# Patient Record
Sex: FEMALE | Race: WHITE | NOT HISPANIC OR LATINO
[De-identification: names, ages, dates, MRNs, and addresses within clinical notes are randomized per-mention and may not be internally consistent; named-entity substitution may affect disease eponyms.]

---

## 2017-04-09 ENCOUNTER — TRANSCRIPTION ENCOUNTER (OUTPATIENT)
Age: 27
End: 2017-04-09

## 2017-04-13 ENCOUNTER — TRANSCRIPTION ENCOUNTER (OUTPATIENT)
Age: 27
End: 2017-04-13

## 2018-09-25 ENCOUNTER — APPOINTMENT (OUTPATIENT)
Dept: INFECTIOUS DISEASE | Facility: CLINIC | Age: 28
End: 2018-09-25
Payer: SELF-PAY

## 2018-09-25 DIAGNOSIS — Z71.89 OTHER SPECIFIED COUNSELING: ICD-10-CM

## 2018-09-25 PROBLEM — Z00.00 ENCOUNTER FOR PREVENTIVE HEALTH EXAMINATION: Status: ACTIVE | Noted: 2018-09-25

## 2018-09-25 PROCEDURE — 90632 HEPA VACCINE ADULT IM: CPT

## 2018-09-25 PROCEDURE — 99401 PREV MED CNSL INDIV APPRX 15: CPT | Mod: 25

## 2018-09-25 PROCEDURE — 90691 TYPHOID VACCINE IM: CPT

## 2018-09-25 PROCEDURE — 90471 IMMUNIZATION ADMIN: CPT | Mod: NC

## 2018-09-25 PROCEDURE — 90472 IMMUNIZATION ADMIN EACH ADD: CPT | Mod: NC

## 2018-09-25 RX ORDER — ATOVAQUONE AND PROGUANIL HYDROCHLORIDE 250; 100 MG/1; MG/1
250-100 TABLET, FILM COATED ORAL DAILY
Qty: 28 | Refills: 0 | Status: ACTIVE | COMMUNITY
Start: 2018-09-25 | End: 1900-01-01

## 2018-09-26 ENCOUNTER — RESULT REVIEW (OUTPATIENT)
Age: 28
End: 2018-09-26

## 2018-10-13 ENCOUNTER — TRANSCRIPTION ENCOUNTER (OUTPATIENT)
Age: 28
End: 2018-10-13

## 2019-02-16 ENCOUNTER — TRANSCRIPTION ENCOUNTER (OUTPATIENT)
Age: 29
End: 2019-02-16

## 2019-04-01 ENCOUNTER — TRANSCRIPTION ENCOUNTER (OUTPATIENT)
Age: 29
End: 2019-04-01

## 2019-04-01 ENCOUNTER — INPATIENT (INPATIENT)
Facility: HOSPITAL | Age: 29
LOS: 0 days | Discharge: HOME | DRG: 833 | End: 2019-04-01
Attending: OBSTETRICS & GYNECOLOGY | Admitting: OBSTETRICS & GYNECOLOGY
Payer: COMMERCIAL

## 2019-04-01 VITALS
WEIGHT: 130.07 LBS | SYSTOLIC BLOOD PRESSURE: 131 MMHG | HEART RATE: 82 BPM | HEIGHT: 62 IN | OXYGEN SATURATION: 100 % | TEMPERATURE: 98 F | DIASTOLIC BLOOD PRESSURE: 85 MMHG | RESPIRATION RATE: 18 BRPM

## 2019-04-01 VITALS
SYSTOLIC BLOOD PRESSURE: 117 MMHG | HEART RATE: 88 BPM | OXYGEN SATURATION: 100 % | RESPIRATION RATE: 18 BRPM | TEMPERATURE: 98 F | DIASTOLIC BLOOD PRESSURE: 82 MMHG

## 2019-04-01 DIAGNOSIS — O00.101 RIGHT TUBAL PREGNANCY WITHOUT INTRAUTERINE PREGNANCY: ICD-10-CM

## 2019-04-01 LAB
ALBUMIN SERPL ELPH-MCNC: 4.7 G/DL — SIGNIFICANT CHANGE UP (ref 3.3–5)
ALP SERPL-CCNC: 55 U/L — SIGNIFICANT CHANGE UP (ref 40–120)
ALT FLD-CCNC: 15 U/L — SIGNIFICANT CHANGE UP (ref 10–45)
ANION GAP SERPL CALC-SCNC: 12 MMOL/L — SIGNIFICANT CHANGE UP (ref 5–17)
ANTIBODY ID 1_1: SIGNIFICANT CHANGE UP
APPEARANCE UR: CLEAR — SIGNIFICANT CHANGE UP
APTT BLD: 28.2 SEC — SIGNIFICANT CHANGE UP (ref 27.5–36.3)
AST SERPL-CCNC: 17 U/L — SIGNIFICANT CHANGE UP (ref 10–40)
BACTERIA # UR AUTO: NEGATIVE — SIGNIFICANT CHANGE UP
BASOPHILS # BLD AUTO: 0 K/UL — SIGNIFICANT CHANGE UP (ref 0–0.2)
BASOPHILS NFR BLD AUTO: 0.4 % — SIGNIFICANT CHANGE UP (ref 0–2)
BILIRUB SERPL-MCNC: 0.3 MG/DL — SIGNIFICANT CHANGE UP (ref 0.2–1.2)
BILIRUB UR-MCNC: NEGATIVE — SIGNIFICANT CHANGE UP
BLD GP AB SCN SERPL QL: POSITIVE — SIGNIFICANT CHANGE UP
BUN SERPL-MCNC: 9 MG/DL — SIGNIFICANT CHANGE UP (ref 7–23)
CALCIUM SERPL-MCNC: 9.2 MG/DL — SIGNIFICANT CHANGE UP (ref 8.4–10.5)
CHLORIDE SERPL-SCNC: 109 MMOL/L — HIGH (ref 96–108)
CO2 SERPL-SCNC: 21 MMOL/L — LOW (ref 22–31)
COLOR SPEC: COLORLESS — SIGNIFICANT CHANGE UP
CREAT SERPL-MCNC: 0.58 MG/DL — SIGNIFICANT CHANGE UP (ref 0.5–1.3)
DIFF PNL FLD: NEGATIVE — SIGNIFICANT CHANGE UP
EOSINOPHIL # BLD AUTO: 0.2 K/UL — SIGNIFICANT CHANGE UP (ref 0–0.5)
EOSINOPHIL NFR BLD AUTO: 3.2 % — SIGNIFICANT CHANGE UP (ref 0–6)
EPI CELLS # UR: 6 — HIGH
GLUCOSE SERPL-MCNC: 103 MG/DL — HIGH (ref 70–99)
GLUCOSE UR QL: NEGATIVE — SIGNIFICANT CHANGE UP
HCG SERPL-ACNC: 92.6 MIU/ML — HIGH
HCT VFR BLD CALC: 38 % — SIGNIFICANT CHANGE UP (ref 34.5–45)
HGB BLD-MCNC: 13.3 G/DL — SIGNIFICANT CHANGE UP (ref 11.5–15.5)
HYALINE CASTS # UR AUTO: 2 /LPF — SIGNIFICANT CHANGE UP (ref 0–7)
INR BLD: 0.94 RATIO — SIGNIFICANT CHANGE UP (ref 0.88–1.16)
KETONES UR-MCNC: NEGATIVE — SIGNIFICANT CHANGE UP
LEUKOCYTE ESTERASE UR-ACNC: NEGATIVE — SIGNIFICANT CHANGE UP
LYMPHOCYTES # BLD AUTO: 2.3 K/UL — SIGNIFICANT CHANGE UP (ref 1–3.3)
LYMPHOCYTES # BLD AUTO: 40.9 % — SIGNIFICANT CHANGE UP (ref 13–44)
MCHC RBC-ENTMCNC: 34.1 PG — HIGH (ref 27–34)
MCHC RBC-ENTMCNC: 34.9 GM/DL — SIGNIFICANT CHANGE UP (ref 32–36)
MCV RBC AUTO: 97.8 FL — SIGNIFICANT CHANGE UP (ref 80–100)
MONOCYTES # BLD AUTO: 0.5 K/UL — SIGNIFICANT CHANGE UP (ref 0–0.9)
MONOCYTES NFR BLD AUTO: 8.9 % — SIGNIFICANT CHANGE UP (ref 2–14)
NEUTROPHILS # BLD AUTO: 2.6 K/UL — SIGNIFICANT CHANGE UP (ref 1.8–7.4)
NEUTROPHILS NFR BLD AUTO: 46.5 % — SIGNIFICANT CHANGE UP (ref 43–77)
NITRITE UR-MCNC: NEGATIVE — SIGNIFICANT CHANGE UP
PH UR: 6.5 — SIGNIFICANT CHANGE UP (ref 5–8)
PLATELET # BLD AUTO: 248 K/UL — SIGNIFICANT CHANGE UP (ref 150–400)
POTASSIUM SERPL-MCNC: 4.1 MMOL/L — SIGNIFICANT CHANGE UP (ref 3.5–5.3)
POTASSIUM SERPL-SCNC: 4.1 MMOL/L — SIGNIFICANT CHANGE UP (ref 3.5–5.3)
PROT SERPL-MCNC: 7 G/DL — SIGNIFICANT CHANGE UP (ref 6–8.3)
PROT UR-MCNC: NEGATIVE — SIGNIFICANT CHANGE UP
PROTHROM AB SERPL-ACNC: 10.8 SEC — SIGNIFICANT CHANGE UP (ref 10–12.9)
RBC # BLD: 3.89 M/UL — SIGNIFICANT CHANGE UP (ref 3.8–5.2)
RBC # FLD: 11.5 % — SIGNIFICANT CHANGE UP (ref 10.3–14.5)
RBC CASTS # UR COMP ASSIST: 2 /HPF — SIGNIFICANT CHANGE UP (ref 0–4)
RH IG SCN BLD-IMP: NEGATIVE — SIGNIFICANT CHANGE UP
SODIUM SERPL-SCNC: 142 MMOL/L — SIGNIFICANT CHANGE UP (ref 135–145)
SP GR SPEC: 1.01 — LOW (ref 1.01–1.02)
UROBILINOGEN FLD QL: NEGATIVE — SIGNIFICANT CHANGE UP
WBC # BLD: 5.6 K/UL — SIGNIFICANT CHANGE UP (ref 3.8–10.5)
WBC # FLD AUTO: 5.6 K/UL — SIGNIFICANT CHANGE UP (ref 3.8–10.5)
WBC UR QL: 7 /HPF — HIGH (ref 0–5)

## 2019-04-01 PROCEDURE — 99285 EMERGENCY DEPT VISIT HI MDM: CPT

## 2019-04-01 PROCEDURE — 84702 CHORIONIC GONADOTROPIN TEST: CPT

## 2019-04-01 PROCEDURE — 86901 BLOOD TYPING SEROLOGIC RH(D): CPT

## 2019-04-01 PROCEDURE — 93975 VASCULAR STUDY: CPT | Mod: 26

## 2019-04-01 PROCEDURE — 86077 PHYS BLOOD BANK SERV XMATCH: CPT

## 2019-04-01 PROCEDURE — 76817 TRANSVAGINAL US OBSTETRIC: CPT | Mod: 26

## 2019-04-01 PROCEDURE — 86870 RBC ANTIBODY IDENTIFICATION: CPT

## 2019-04-01 PROCEDURE — 81001 URINALYSIS AUTO W/SCOPE: CPT

## 2019-04-01 PROCEDURE — 76817 TRANSVAGINAL US OBSTETRIC: CPT

## 2019-04-01 PROCEDURE — 93975 VASCULAR STUDY: CPT

## 2019-04-01 PROCEDURE — 85027 COMPLETE CBC AUTOMATED: CPT

## 2019-04-01 PROCEDURE — 86850 RBC ANTIBODY SCREEN: CPT

## 2019-04-01 PROCEDURE — 86900 BLOOD TYPING SEROLOGIC ABO: CPT

## 2019-04-01 PROCEDURE — 85610 PROTHROMBIN TIME: CPT

## 2019-04-01 PROCEDURE — 85730 THROMBOPLASTIN TIME PARTIAL: CPT

## 2019-04-01 PROCEDURE — 80053 COMPREHEN METABOLIC PANEL: CPT

## 2019-04-01 NOTE — DISCHARGE NOTE PROVIDER - HOSPITAL COURSE
27yo  LMP: sent in by Dr. Head for ectopic pregnancy s/p MTX x2. Initial bHcg>5000, downtrended to 103 s/p 2 doses MTX. hBCG today 92.6 with    TVUS showing Right-sided adnexal mass consistent with known right-sided     pregnancy measures 1.8 cm x 1.4 cm x 2.1 cm. Within the mass an oval     cystic component likely representing the gestational sac is appreciated     measuring 1.4 cm x 1.2 cm x 9 mm. There is no yolk sac or fetal pole is     identified. 4.3 cm x 3.6 cm x 3.8 cm simple right ovarian cyst. Normal blood flow.    3.4 cm x 3.5 cm x 2.6 cm hemorrhagic left ovarian cyst. Normal blood flow.        Patient has been HD stable upon presentation with no symptoms and has continued to be stable in holding. bHCG has downtrended from previous value and mass has decreased in size. Patient would still like to have surgery but will have it scheduled for later this week as her is not negative and her ectopic is still present on US. 29yo  LMP: sent in by Dr. Head for ectopic pregnancy s/p MTX x2. Initial bHcg>5000, downtrended to 103 s/p 2 doses MTX.         T(C): 36.8 (19 @ 15:41), Max: 36.9 (19 @ 13:49)    HR: 88 (19 @ 15:41) (81 - 98)    BP: 117/82 (19 @ 15:41) (116/76 - 146/88)    RR: 18 (19 @ 15:41) (16 - 20)    SpO2: 100% (19 @ 15:41) (98% - 100%)        bHCG today 92.6     TVUS showing Right-sided adnexal mass consistent with known right-sided     pregnancy measures 1.8 cm x 1.4 cm x 2.1 cm. Within the mass an oval     cystic component likely representing the gestational sac is appreciated     measuring 1.4 cm x 1.2 cm x 9 mm. There is no yolk sac or fetal pole is     identified. 4.3 cm x 3.6 cm x 3.8 cm simple right ovarian cyst. Normal blood flow.    3.4 cm x 3.5 cm x 2.6 cm hemorrhagic left ovarian cyst. Normal blood flow.        Patient has been HD stable upon presentation with no symptoms and has continued to be stable in holding. bHCG has downtrended from previous value and mass has decreased in size. Patient would still like to have surgery but will have it scheduled for later this week as her is not negative and her ectopic is still present on US. Due to her condition, surgery would be an elective procedure. The options were discussed with the patient as well as risks in benefits in regards to waiting for surgery while here, possibly overnight, or to return for a scheduled cases late this week. Patient expressed understanding and will f/u with her OB this week and plan for a scheduled salpingectomy. 29yo  LMP: sent in by Dr. Head for ectopic pregnancy s/p MTX x2. Initial bHcg>5000, downtrended to 103 s/p 2 doses MTX.         T(C): 36.8 (19 @ 15:41), Max: 36.9 (19 @ 13:49)    HR: 88 (19 @ 15:41) (81 - 98)    BP: 117/82 (19 @ 15:41) (116/76 - 146/88)    RR: 18 (19 @ 15:41) (16 - 20)    SpO2: 100% (19 @ 15:41) (98% - 100%)        bHCG today 92.6     TVUS showing Right-sided adnexal mass consistent with known right-sided     pregnancy measures 1.8 cm x 1.4 cm x 2.1 cm. Within the mass an oval     cystic component likely representing the gestational sac is appreciated     measuring 1.4 cm x 1.2 cm x 9 mm. There is no yolk sac or fetal pole is     identified. 4.3 cm x 3.6 cm x 3.8 cm simple right ovarian cyst. Normal blood flow.    3.4 cm x 3.5 cm x 2.6 cm hemorrhagic left ovarian cyst. Normal blood flow.        Patient has been HD stable upon presentation with mild abdominal pain and has continued to be stable in holding. bHCG has downtrended from previous value and mass has decreased in size. Patient would still like to have surgery but will have it scheduled for later this week as her is not negative and her ectopic is still present on US. Due to her condition, surgery would be an elective procedure. The options were discussed with the patient as well as risks in benefits in regards to waiting for surgery while here, possibly overnight, or to return for a scheduled case late this week. Patient expressed understanding and will f/u with her OB this week and plan for a scheduled salpingectomy. 27yo  LMP: sent in by Dr. Head for ectopic pregnancy s/p MTX x2. Initial bHcg>5000, downtrended to 103 s/p 2 doses MTX.         T(C): 36.8 (19 @ 15:41), Max: 36.9 (19 @ 13:49)    HR: 88 (19 @ 15:41) (81 - 98)    BP: 117/82 (19 @ 15:41) (116/76 - 146/88)    RR: 18 (19 @ 15:41) (16 - 20)    SpO2: 100% (19 @ 15:41) (98% - 100%)        bHCG today 92.6     TVUS showing Right-sided adnexal mass consistent with known right-sided     pregnancy measures 1.8 cm x 1.4 cm x 2.1 cm. Within the mass an oval     cystic component likely representing the gestational sac is appreciated     measuring 1.4 cm x 1.2 cm x 9 mm. There is no yolk sac or fetal pole is     identified. 4.3 cm x 3.6 cm x 3.8 cm simple right ovarian cyst. Normal blood flow.    3.4 cm x 3.5 cm x 2.6 cm hemorrhagic left ovarian cyst. Normal blood flow.        Patient has been HD stable upon presentation with mild abdominal pain and has continued to be stable in holding. bHCG has downtrended from previous value and mass has decreased in size. Patient would still like to have surgery but will have it scheduled for later this week as her is not negative and her ectopic is still present on US. Due to her condition, surgery would be an elective procedure. The options were discussed with the patient as well as risks in benefits in regards to waiting for surgery while here, possibly overnight, or to return for a scheduled case late this week. Patient expressed understanding and will f/u with her OB this week and plan for a scheduled salpingectomy.        Back note:Pt seen and examined by me today.  P0 with known right sided ectopic pregnancy, s/p MTX x2 with close follow up in office with downtrending quant.  Pt presents to ER I nhopes of having surgery done, however, patient denies any pain,exam is unremarkable and there are no signs of ruptured ectopic.  Options reviewed with patient and se opted for discharge, follow up in office this week and schedule surgery as outpatient.  Strict instructions given as to when to call back/come to ER sooner.     Kaylee

## 2019-04-01 NOTE — ED ADULT NURSE NOTE - OBJECTIVE STATEMENT
27 y/o F, reported to ED from home. A&ox3, c/o RLQ abd pain. Pt reports that she has 3/10 pain with palpation. 29 y/o F, reported to ED from home. A&ox3, c/o RLQ abd pain. Pt reports that she has 3/10 pain with palpation. Pt reports that she was diagnosed with an ectopic pregnancy in January 2019. Pt was given 2 rounds of Methotrexate one in January and one in February. Pt reports that her Beta HCG levels have remained elevated. Pt also reports that she has a growth that has been consistent in size since the two rounds of Methotrexate. Pt reports some "twinging in her abdomen" since yesterday. Pt denies any vaginal bleeding currently. Pt reports that her LMP was 12/14/19. Pt denies LOC, SOB, C/P, N/V/D, abd pain. Pt denies fever or chills. Pt denies urinary symptoms. Pt's  is at the bedside. Pt's OBGYN sent her in for surgery. Will continue to monitor pt.

## 2019-04-01 NOTE — ED PROVIDER NOTE - NS ED ROS FT
ROS:  GENERAL: No fever, no chills  EYES: no change in vision  HEENT: no trouble swallowing, no trouble speaking  CARDIAC: no chest pain  PULMONARY: no cough, no shortness of breath  GI: RLQ intermittent abdominal pain, no nausea, no vomiting, no diarrhea, no constipation  : No dysuria, no frequency, no change in appearance, or odor of urine  SKIN: no rashes  NEURO: no headache, no weakness  MSK: No joint pain  ~Madhav Meléndez D.O. -Resident

## 2019-04-01 NOTE — DISCHARGE NOTE PROVIDER - CARE PROVIDER_API CALL
Emery Page (MD)  Obstetrics  Gynecology  [address] [facility] [address] [place] [address]  Phone: [phone]  Fax: [phone]  Follow Up Time: Rob Head (MD)  Obstetrics and Gynecology  [address] [facility], [place] [address]  Phone: [phone]  Fax: [phone]  Follow Up Time:

## 2019-04-01 NOTE — DISCHARGE NOTE PROVIDER - NSDCCPCAREPLAN_GEN_ALL_CORE_FT
PRINCIPAL DISCHARGE DIAGNOSIS  Diagnosis: Right tubal pregnancy without intrauterine pregnancy  Assessment and Plan of Treatment: -plan for scheduled R salpingectomy this week  -f/u with her OB office tomorrow/Wednesday PRINCIPAL DISCHARGE DIAGNOSIS  Diagnosis: Right tubal pregnancy without intrauterine pregnancy  Assessment and Plan of Treatment: HD stable with ectopic reducing in size and bHCG downtrending   -plan for scheduled R salpingectomy  -f/u with her OB office tomorrow/Wednesday

## 2019-04-01 NOTE — ED PROVIDER NOTE - ATTENDING CONTRIBUTION TO CARE
MD Chatman:  patient seen and evaluated with the resident.  I was present for key portions of the History & Physical, and I agree with the Impression & Plan.  MD Chatman:  28F,  c/o R ectopic since January.  Context:  has received MTX x 2, but has persistent ectopic on US.  Associated Sx: no bleeding, no cramping.  Better/worse: no clear modifiers.    VS: wnl.  Physical Exam: adult F, NAD, NCAT, PERRL, EOMI, neck supple, RRR, CTA B, Abd: s/nd/+RLQ TTP. Ext: no edema, Neuro: AAOx3, ambulates w/o diff, strength 5/5 & symmetric throughout.   Impression:  R tubal ectopic pregnancy.  No clinical evidence of hemorrhage.  Failing MTX treatment.  Primary GYN = Dr. Head, who has been paged.    Plan:  pre-op labs, likely admit to Dr. Head.

## 2019-04-01 NOTE — H&P ADULT - NSHPREVIEWOFSYSTEMS_GEN_ALL_CORE
Denies recent trauma, fevers, chills, headache, dizziness, nausea, vomiting, dysuria, urinary frequency, hematuria, diarrhea, constipation, chest pain, shortness of breath, cough

## 2019-04-01 NOTE — DISCHARGE NOTE NURSING/CASE MANAGEMENT/SOCIAL WORK - NSDCDPATPORTLINK_GEN_ALL_CORE
You can access the [facility] Patient Portal, offered by [facility], by registering with the following website: [email or web address]

## 2019-04-01 NOTE — ED PROVIDER NOTE - CLINICAL SUMMARY MEDICAL DECISION MAKING FREE TEXT BOX
27yo f  lmp  presents as a send in from dr. edgar for ectopic pregnancy failed mtx x2, stable vitals, exam wnl mild rlq ttp, nad, will get preop labs, ua, analgesia, call dr. edgar to discuss.

## 2019-04-01 NOTE — H&P ADULT - ASSESSMENT
A/P: 27 yo  with known R tubal ectopic pregnancy s/p MTXx2 two months ago with abdominal pain with visualized tubal mass with bHCG downtrended to 92. NPO since 10pm last night. Currently HD stable. A/P: 27 yo  with known R tubal ectopic pregnancy s/p MTXx2 two months ago with abdominal pain with visualized tubal mass with bHCG downtrended to 92. NPO since 10pm last night. Currently HD stable. Coags wnl.

## 2019-04-01 NOTE — H&P ADULT - PROBLEM SELECTOR PLAN 1
-admit to Dr. Head  -book for R laparoscopic salpingectomy  -NPO  -preop labs  -f/u TVUS    D/w Jus Head and Kaylee Rodriguez PGY1. -admit to Dr. Head  -book for R laparoscopic salpingectomy  -NPO  -T&S  -f/u TVUS    D/w Jus Head and Kaylee Rodriguez PGY1.

## 2019-04-01 NOTE — H&P ADULT - HISTORY OF PRESENT ILLNESS
29yo  LMP: sent in by Dr. Head for ectopic pregnancy s/p MTX x2. Initial bHcg>5000, downtrended to 103 s/p 2 doses MTX. She has continued to have RLQ abdominal pain and pelvic pressure. Denies vaginal bleeding or discharge. Last ultrasound was last Monday showed mass in R fallopian tube measuring 2.0cm. Denies sexual activity since January and hx of STIs. Is not using any form of contraception. 27yo  LMP: sent in by Dr. Head for ectopic pregnancy s/p MTX x2. Initial bHcg>5000, downtrended to 103 s/p 2 doses MTX. First dose at time of diagnosis in mid January and second in early February. She has continued to have RLQ abdominal pain and pelvic pressure. Denies vaginal bleeding or discharge. Last ultrasound was last Monday showed mass in R fallopian tube measuring 2.0cm. Denies sexual activity since January and hx of STIs. Is not using any form of contraception.

## 2019-04-01 NOTE — ED PROVIDER NOTE - OBJECTIVE STATEMENT
29yo f  lmp  presents as a send in from dr. edgar for ectopic pregnancy failed mtx x2. patient reports seeing dr. edgar and having 2 rounds of MTX with hcgs still in 100's and intermittent twinges to right side of abdomen. patient denies vaginal bleeding or discharge, Denies recent trauma, fevers, chills, headache, dizziness, nausea, vomiting, dysuria, freq, hematuria, diarrhea, constipation, chest pain, shortness of breath, cough. Last ultrasounds was last Monday showed gest in r fallopean tube. denies hx of std's in past     obgyn- Rob Lujan D [phone]

## 2019-04-01 NOTE — ED PROVIDER NOTE - PHYSICAL EXAMINATION
Physical Exam:  Gen: NAD, AOx3, non-toxic appearing, able to ambulate without assistance  Head: NCAT  HEENT: EOMI, PEERLA, normal conjunctiva, tongue midline, oral mucosa moist  Lung: CTAB, no respiratory distress, no wheezes/rhonchi/rales B/L, speaking in full sentences  CV: RRR, no murmurs, rubs or gallops  Abd: soft, mild rlq tenderness, ND, no guarding, no rigidity, no CVA tenderness   MSK: no visible deformities, ROM normal in UE/LE, no back pain  Neuro: No focal sensory or motor deficits  Skin: Warm, well perfused, no rash  Psych: normal affect, calm  ~Madhav Meléndez D.O. -Resident

## 2019-04-01 NOTE — ED ADULT NURSE NOTE - NSIMPLEMENTINTERV_GEN_ALL_ED
Implemented All Universal Safety Interventions:  [place] to call system. Call bell, personal items and telephone within reach. Instruct patient to call for assistance. Room bathroom lighting operational. Non-slip footwear when patient is off stretcher. Physically safe environment: no spills, clutter or unnecessary equipment. Stretcher in lowest position, wheels locked, appropriate side rails in place.

## 2019-04-01 NOTE — H&P ADULT - NSHPPHYSICALEXAM_GEN_ALL_CORE
General: Well appearing, NAD  Abdominal: 4/10 abdominal pain in RLQ and suprapubically, no rebound or guarding  Pelvic: Normal external genitalia, no vaginal bleeding, mucosa normal in appearance, no discharge. R adnexal tenderness on exam. No cervical motion tenderness. Normal anteverted, 6 weeks size uterus. No masses felt.

## 2019-04-02 PROBLEM — O00.90 UNSPECIFIED ECTOPIC PREGNANCY WITHOUT INTRAUTERINE PREGNANCY: Chronic | Status: ACTIVE | Noted: 2019-04-01

## 2019-04-04 ENCOUNTER — TRANSCRIPTION ENCOUNTER (OUTPATIENT)
Age: 29
End: 2019-04-04

## 2019-04-04 RX ORDER — CEFAZOLIN SODIUM 1 G
2000 VIAL (EA) INJECTION ONCE
Qty: 0 | Refills: 0 | Status: DISCONTINUED | OUTPATIENT
Start: 2019-04-05 | End: 2019-04-20

## 2019-04-05 ENCOUNTER — RESULT REVIEW (OUTPATIENT)
Age: 29
End: 2019-04-05

## 2019-04-05 ENCOUNTER — OUTPATIENT (OUTPATIENT)
Dept: OUTPATIENT SERVICES | Facility: HOSPITAL | Age: 29
LOS: 1 days | End: 2019-04-05
Payer: COMMERCIAL

## 2019-04-05 VITALS
SYSTOLIC BLOOD PRESSURE: 100 MMHG | HEART RATE: 65 BPM | OXYGEN SATURATION: 100 % | DIASTOLIC BLOOD PRESSURE: 63 MMHG | TEMPERATURE: 98 F | RESPIRATION RATE: 18 BRPM

## 2019-04-05 VITALS
RESPIRATION RATE: 18 BRPM | WEIGHT: 134.48 LBS | HEART RATE: 64 BPM | SYSTOLIC BLOOD PRESSURE: 120 MMHG | OXYGEN SATURATION: 99 % | DIASTOLIC BLOOD PRESSURE: 74 MMHG | HEIGHT: 63 IN | TEMPERATURE: 99 F

## 2019-04-05 DIAGNOSIS — O00.109 UNSPECIFIED TUBAL PREGNANCY WITHOUT INTRAUTERINE PREGNANCY: ICD-10-CM

## 2019-04-05 LAB
ANTIBODY ID 1_1: SIGNIFICANT CHANGE UP
BLD GP AB SCN SERPL QL: POSITIVE — SIGNIFICANT CHANGE UP
RH IG SCN BLD-IMP: NEGATIVE — SIGNIFICANT CHANGE UP

## 2019-04-05 PROCEDURE — 86077 PHYS BLOOD BANK SERV XMATCH: CPT

## 2019-04-05 PROCEDURE — 88305 TISSUE EXAM BY PATHOLOGIST: CPT

## 2019-04-05 PROCEDURE — 58120 DILATION AND CURETTAGE: CPT

## 2019-04-05 PROCEDURE — 88305 TISSUE EXAM BY PATHOLOGIST: CPT | Mod: 26

## 2019-04-05 PROCEDURE — 88302 TISSUE EXAM BY PATHOLOGIST: CPT

## 2019-04-05 PROCEDURE — 86870 RBC ANTIBODY IDENTIFICATION: CPT

## 2019-04-05 PROCEDURE — 86900 BLOOD TYPING SEROLOGIC ABO: CPT

## 2019-04-05 PROCEDURE — 86850 RBC ANTIBODY SCREEN: CPT

## 2019-04-05 PROCEDURE — 86901 BLOOD TYPING SEROLOGIC RH(D): CPT

## 2019-04-05 PROCEDURE — 88302 TISSUE EXAM BY PATHOLOGIST: CPT | Mod: 26

## 2019-04-05 PROCEDURE — 59151 TREAT ECTOPIC PREGNANCY: CPT | Mod: RT

## 2019-04-05 RX ORDER — ONDANSETRON 8 MG/1
4 TABLET, FILM COATED ORAL ONCE
Qty: 0 | Refills: 0 | Status: COMPLETED | OUTPATIENT
Start: 2019-04-05 | End: 2019-04-05

## 2019-04-05 RX ORDER — LIDOCAINE HCL 20 MG/ML
0.4 VIAL (ML) INJECTION ONCE
Qty: 0 | Refills: 0 | Status: DISCONTINUED | OUTPATIENT
Start: 2019-04-05 | End: 2019-04-05

## 2019-04-05 RX ORDER — HYDROMORPHONE HYDROCHLORIDE 2 MG/ML
0.25 INJECTION INTRAMUSCULAR; INTRAVENOUS; SUBCUTANEOUS
Qty: 0 | Refills: 0 | Status: DISCONTINUED | OUTPATIENT
Start: 2019-04-05 | End: 2019-04-05

## 2019-04-05 RX ORDER — OXYCODONE HYDROCHLORIDE 5 MG/1
1 TABLET ORAL
Qty: 12 | Refills: 0
Start: 2019-04-05

## 2019-04-05 RX ORDER — SODIUM CHLORIDE 9 MG/ML
3 INJECTION INTRAMUSCULAR; INTRAVENOUS; SUBCUTANEOUS EVERY 8 HOURS
Qty: 0 | Refills: 0 | Status: DISCONTINUED | OUTPATIENT
Start: 2019-04-05 | End: 2019-04-05

## 2019-04-05 RX ADMIN — SODIUM CHLORIDE 3 MILLILITER(S): 9 INJECTION INTRAMUSCULAR; INTRAVENOUS; SUBCUTANEOUS at 12:57

## 2019-04-05 RX ADMIN — HYDROMORPHONE HYDROCHLORIDE 0.25 MILLIGRAM(S): 2 INJECTION INTRAMUSCULAR; INTRAVENOUS; SUBCUTANEOUS at 16:40

## 2019-04-05 RX ADMIN — HYDROMORPHONE HYDROCHLORIDE 0.25 MILLIGRAM(S): 2 INJECTION INTRAMUSCULAR; INTRAVENOUS; SUBCUTANEOUS at 18:15

## 2019-04-05 RX ADMIN — HYDROMORPHONE HYDROCHLORIDE 0.25 MILLIGRAM(S): 2 INJECTION INTRAMUSCULAR; INTRAVENOUS; SUBCUTANEOUS at 16:55

## 2019-04-05 RX ADMIN — ONDANSETRON 4 MILLIGRAM(S): 8 TABLET, FILM COATED ORAL at 17:28

## 2019-04-05 NOTE — BRIEF OPERATIVE NOTE - OPERATION/FINDINGS
Right tubal ectopic pregnancy  Normal left tube  Ovaries with small simple cysts  Normal uterus  Normal upper abdomen

## 2019-04-05 NOTE — BRIEF OPERATIVE NOTE - NSICDXBRIEFPROCEDURE_GEN_ALL_CORE_FT
PROCEDURES:  Salpingectomy for ectopic pregnancy 05-Apr-2019 15:42:43  Rk Quinones  Dilation and curettage, uterus 05-Apr-2019 15:42:17  Rk Quinones  Laparoscopic right salpingectomy for ectopic pregnancy 05-Apr-2019 15:42:08  Rk Quinones

## 2019-04-05 NOTE — ASU DISCHARGE PLAN (ADULT/PEDIATRIC) - CARE PROVIDER_API CALL
Rob Head (MD)  Obstetrics and Gynecology  [address] [facility], [place] [address]  Phone: [phone]  Fax: [phone]  Follow Up Time:

## 2019-04-12 LAB — SURGICAL PATHOLOGY STUDY: SIGNIFICANT CHANGE UP

## 2019-09-09 ENCOUNTER — APPOINTMENT (OUTPATIENT)
Dept: RADIOLOGY | Facility: HOSPITAL | Age: 29
End: 2019-09-09
Payer: COMMERCIAL

## 2019-09-09 ENCOUNTER — OUTPATIENT (OUTPATIENT)
Dept: OUTPATIENT SERVICES | Facility: HOSPITAL | Age: 29
LOS: 1 days | End: 2019-09-09
Payer: COMMERCIAL

## 2019-09-09 DIAGNOSIS — N97.1 FEMALE INFERTILITY OF TUBAL ORIGIN: ICD-10-CM

## 2019-09-09 PROCEDURE — 58340 CATHETER FOR HYSTEROGRAPHY: CPT

## 2019-09-09 PROCEDURE — 74740 X-RAY FEMALE GENITAL TRACT: CPT | Mod: 26

## 2019-09-09 PROCEDURE — 74740 X-RAY FEMALE GENITAL TRACT: CPT

## 2019-10-09 ENCOUNTER — TRANSCRIPTION ENCOUNTER (OUTPATIENT)
Age: 29
End: 2019-10-09

## 2019-10-14 ENCOUNTER — RESULT REVIEW (OUTPATIENT)
Age: 29
End: 2019-10-14

## 2019-11-19 ENCOUNTER — ASOB RESULT (OUTPATIENT)
Age: 29
End: 2019-11-19

## 2019-11-19 ENCOUNTER — APPOINTMENT (OUTPATIENT)
Dept: ANTEPARTUM | Facility: CLINIC | Age: 29
End: 2019-11-19
Payer: COMMERCIAL

## 2019-11-19 PROCEDURE — 76801 OB US < 14 WKS SINGLE FETUS: CPT

## 2019-11-19 PROCEDURE — 36416 COLLJ CAPILLARY BLOOD SPEC: CPT

## 2019-11-19 PROCEDURE — 76813 OB US NUCHAL MEAS 1 GEST: CPT

## 2019-11-20 ENCOUNTER — TRANSCRIPTION ENCOUNTER (OUTPATIENT)
Age: 29
End: 2019-11-20

## 2019-11-21 ENCOUNTER — TRANSCRIPTION ENCOUNTER (OUTPATIENT)
Age: 29
End: 2019-11-21

## 2020-04-26 ENCOUNTER — MESSAGE (OUTPATIENT)
Age: 30
End: 2020-04-26

## 2020-05-30 ENCOUNTER — INPATIENT (INPATIENT)
Facility: HOSPITAL | Age: 30
LOS: 1 days | Discharge: HOME | End: 2020-06-01
Attending: OBSTETRICS & GYNECOLOGY | Admitting: OBSTETRICS & GYNECOLOGY
Payer: COMMERCIAL

## 2020-05-30 DIAGNOSIS — Z34.80 ENCOUNTER FOR SUPERVISION OF OTHER NORMAL PREGNANCY, UNSPECIFIED TRIMESTER: ICD-10-CM

## 2020-05-30 DIAGNOSIS — O26.899 OTHER SPECIFIED PREGNANCY RELATED CONDITIONS, UNSPECIFIED TRIMESTER: ICD-10-CM

## 2020-05-30 DIAGNOSIS — Z3A.00 WEEKS OF GESTATION OF PREGNANCY NOT SPECIFIED: ICD-10-CM

## 2020-05-30 LAB
ANTIBODY ID 1_1: SIGNIFICANT CHANGE UP
BASOPHILS # BLD AUTO: 0.04 K/UL — SIGNIFICANT CHANGE UP (ref 0–0.2)
BASOPHILS NFR BLD AUTO: 0.3 % — SIGNIFICANT CHANGE UP (ref 0–2)
BLD GP AB SCN SERPL QL: POSITIVE — SIGNIFICANT CHANGE UP
EOSINOPHIL # BLD AUTO: 0.15 K/UL — SIGNIFICANT CHANGE UP (ref 0–0.5)
EOSINOPHIL NFR BLD AUTO: 1.1 % — SIGNIFICANT CHANGE UP (ref 0–6)
HCT VFR BLD CALC: 35.9 % — SIGNIFICANT CHANGE UP (ref 34.5–45)
HGB BLD-MCNC: 11.9 G/DL — SIGNIFICANT CHANGE UP (ref 11.5–15.5)
IMM GRANULOCYTES NFR BLD AUTO: 0.9 % — SIGNIFICANT CHANGE UP (ref 0–1.5)
LYMPHOCYTES # BLD AUTO: 18.1 % — SIGNIFICANT CHANGE UP (ref 13–44)
LYMPHOCYTES # BLD AUTO: 2.55 K/UL — SIGNIFICANT CHANGE UP (ref 1–3.3)
MCHC RBC-ENTMCNC: 33.1 GM/DL — SIGNIFICANT CHANGE UP (ref 32–36)
MCHC RBC-ENTMCNC: 33.9 PG — SIGNIFICANT CHANGE UP (ref 27–34)
MCV RBC AUTO: 102.3 FL — HIGH (ref 80–100)
MONOCYTES # BLD AUTO: 1.08 K/UL — HIGH (ref 0–0.9)
MONOCYTES NFR BLD AUTO: 7.7 % — SIGNIFICANT CHANGE UP (ref 2–14)
NEUTROPHILS # BLD AUTO: 10.16 K/UL — HIGH (ref 1.8–7.4)
NEUTROPHILS NFR BLD AUTO: 71.9 % — SIGNIFICANT CHANGE UP (ref 43–77)
NRBC # BLD: 0 /100 WBCS — SIGNIFICANT CHANGE UP (ref 0–0)
PLATELET # BLD AUTO: 210 K/UL — SIGNIFICANT CHANGE UP (ref 150–400)
RBC # BLD: 3.51 M/UL — LOW (ref 3.8–5.2)
RBC # FLD: 13.2 % — SIGNIFICANT CHANGE UP (ref 10.3–14.5)
RH IG SCN BLD-IMP: NEGATIVE — SIGNIFICANT CHANGE UP
SARS-COV-2 RNA SPEC QL NAA+PROBE: SIGNIFICANT CHANGE UP
WBC # BLD: 14.11 K/UL — HIGH (ref 3.8–10.5)
WBC # FLD AUTO: 14.11 K/UL — HIGH (ref 3.8–10.5)

## 2020-05-30 PROCEDURE — 86077 PHYS BLOOD BANK SERV XMATCH: CPT

## 2020-05-30 RX ORDER — SODIUM CHLORIDE 9 MG/ML
1000 INJECTION, SOLUTION INTRAVENOUS
Refills: 0 | Status: DISCONTINUED | OUTPATIENT
Start: 2020-05-30 | End: 2020-05-31

## 2020-05-30 RX ORDER — CITRIC ACID/SODIUM CITRATE 300-500 MG
15 SOLUTION, ORAL ORAL EVERY 6 HOURS
Refills: 0 | Status: DISCONTINUED | OUTPATIENT
Start: 2020-05-30 | End: 2020-05-31

## 2020-05-30 RX ORDER — OXYTOCIN 10 UNIT/ML
333.33 VIAL (ML) INJECTION
Qty: 20 | Refills: 0 | Status: DISCONTINUED | OUTPATIENT
Start: 2020-05-30 | End: 2020-06-01

## 2020-05-30 RX ADMIN — SODIUM CHLORIDE 125 MILLILITER(S): 9 INJECTION, SOLUTION INTRAVENOUS at 16:48

## 2020-05-30 RX ADMIN — SODIUM CHLORIDE 125 MILLILITER(S): 9 INJECTION, SOLUTION INTRAVENOUS at 16:58

## 2020-05-30 RX ADMIN — Medication 15 MILLILITER(S): at 16:50

## 2020-05-30 RX ADMIN — Medication 15 MILLILITER(S): at 23:32

## 2020-05-30 NOTE — PATIENT PROFILE OB - VISION (WITH CORRECTIVE LENSES IF THE PATIENT USUALLY WEARS THEM):
glasses and has contacts in now/Normal vision: sees adequately in most situations; can see medication labels, newsprint

## 2020-05-31 VITALS
TEMPERATURE: 99 F | SYSTOLIC BLOOD PRESSURE: 141 MMHG | OXYGEN SATURATION: 100 % | HEART RATE: 108 BPM | RESPIRATION RATE: 18 BRPM | DIASTOLIC BLOOD PRESSURE: 70 MMHG

## 2020-05-31 LAB
KLEIHAUER-BETKE CALCULATION: 0 % — SIGNIFICANT CHANGE UP (ref 0–0.3)
T PALLIDUM AB TITR SER: NEGATIVE — SIGNIFICANT CHANGE UP

## 2020-05-31 RX ORDER — SIMETHICONE 80 MG/1
80 TABLET, CHEWABLE ORAL EVERY 4 HOURS
Refills: 0 | Status: DISCONTINUED | OUTPATIENT
Start: 2020-05-31 | End: 2020-06-01

## 2020-05-31 RX ORDER — PRAMOXINE HYDROCHLORIDE 150 MG/15G
1 AEROSOL, FOAM RECTAL EVERY 4 HOURS
Refills: 0 | Status: DISCONTINUED | OUTPATIENT
Start: 2020-05-31 | End: 2020-06-01

## 2020-05-31 RX ORDER — HYDROCORTISONE 1 %
1 OINTMENT (GRAM) TOPICAL EVERY 6 HOURS
Refills: 0 | Status: DISCONTINUED | OUTPATIENT
Start: 2020-05-31 | End: 2020-06-01

## 2020-05-31 RX ORDER — OXYCODONE HYDROCHLORIDE 5 MG/1
5 TABLET ORAL
Refills: 0 | Status: DISCONTINUED | OUTPATIENT
Start: 2020-05-31 | End: 2020-06-01

## 2020-05-31 RX ORDER — BENZOCAINE 10 %
1 GEL (GRAM) MUCOUS MEMBRANE EVERY 6 HOURS
Refills: 0 | Status: DISCONTINUED | OUTPATIENT
Start: 2020-05-31 | End: 2020-06-01

## 2020-05-31 RX ORDER — KETOROLAC TROMETHAMINE 30 MG/ML
30 SYRINGE (ML) INJECTION ONCE
Refills: 0 | Status: DISCONTINUED | OUTPATIENT
Start: 2020-05-31 | End: 2020-05-31

## 2020-05-31 RX ORDER — DIPHENHYDRAMINE HCL 50 MG
25 CAPSULE ORAL EVERY 6 HOURS
Refills: 0 | Status: DISCONTINUED | OUTPATIENT
Start: 2020-05-31 | End: 2020-06-01

## 2020-05-31 RX ORDER — OXYTOCIN 10 UNIT/ML
333.33 VIAL (ML) INJECTION
Qty: 20 | Refills: 0 | Status: DISCONTINUED | OUTPATIENT
Start: 2020-05-31 | End: 2020-06-01

## 2020-05-31 RX ORDER — DIBUCAINE 1 %
1 OINTMENT (GRAM) RECTAL EVERY 6 HOURS
Refills: 0 | Status: DISCONTINUED | OUTPATIENT
Start: 2020-05-31 | End: 2020-06-01

## 2020-05-31 RX ORDER — POLYETHYLENE GLYCOL 3350 17 G/17G
17 POWDER, FOR SOLUTION ORAL
Refills: 0 | Status: DISCONTINUED | OUTPATIENT
Start: 2020-05-31 | End: 2020-06-01

## 2020-05-31 RX ORDER — ACETAMINOPHEN 500 MG
975 TABLET ORAL
Refills: 0 | Status: DISCONTINUED | OUTPATIENT
Start: 2020-05-31 | End: 2020-06-01

## 2020-05-31 RX ORDER — MAGNESIUM HYDROXIDE 400 MG/1
30 TABLET, CHEWABLE ORAL
Refills: 0 | Status: DISCONTINUED | OUTPATIENT
Start: 2020-05-31 | End: 2020-06-01

## 2020-05-31 RX ORDER — OXYCODONE HYDROCHLORIDE 5 MG/1
5 TABLET ORAL ONCE
Refills: 0 | Status: DISCONTINUED | OUTPATIENT
Start: 2020-05-31 | End: 2020-06-01

## 2020-05-31 RX ORDER — LANOLIN
1 OINTMENT (GRAM) TOPICAL EVERY 6 HOURS
Refills: 0 | Status: DISCONTINUED | OUTPATIENT
Start: 2020-05-31 | End: 2020-06-01

## 2020-05-31 RX ORDER — SODIUM CHLORIDE 9 MG/ML
3 INJECTION INTRAMUSCULAR; INTRAVENOUS; SUBCUTANEOUS EVERY 8 HOURS
Refills: 0 | Status: DISCONTINUED | OUTPATIENT
Start: 2020-05-31 | End: 2020-06-01

## 2020-05-31 RX ORDER — AER TRAVELER 0.5 G/1
1 SOLUTION RECTAL; TOPICAL EVERY 4 HOURS
Refills: 0 | Status: DISCONTINUED | OUTPATIENT
Start: 2020-05-31 | End: 2020-06-01

## 2020-05-31 RX ORDER — TETANUS TOXOID, REDUCED DIPHTHERIA TOXOID AND ACELLULAR PERTUSSIS VACCINE, ADSORBED 5; 2.5; 8; 8; 2.5 [IU]/.5ML; [IU]/.5ML; UG/.5ML; UG/.5ML; UG/.5ML
0.5 SUSPENSION INTRAMUSCULAR ONCE
Refills: 0 | Status: DISCONTINUED | OUTPATIENT
Start: 2020-05-31 | End: 2020-06-01

## 2020-05-31 RX ORDER — IBUPROFEN 200 MG
600 TABLET ORAL EVERY 6 HOURS
Refills: 0 | Status: COMPLETED | OUTPATIENT
Start: 2020-05-31 | End: 2021-04-29

## 2020-05-31 RX ORDER — IBUPROFEN 200 MG
600 TABLET ORAL EVERY 6 HOURS
Refills: 0 | Status: DISCONTINUED | OUTPATIENT
Start: 2020-05-31 | End: 2020-06-01

## 2020-05-31 RX ADMIN — OXYCODONE HYDROCHLORIDE 5 MILLIGRAM(S): 5 TABLET ORAL at 16:48

## 2020-05-31 RX ADMIN — POLYETHYLENE GLYCOL 3350 17 GRAM(S): 17 POWDER, FOR SOLUTION ORAL at 13:25

## 2020-05-31 RX ADMIN — OXYCODONE HYDROCHLORIDE 5 MILLIGRAM(S): 5 TABLET ORAL at 23:46

## 2020-05-31 RX ADMIN — Medication 15 MILLILITER(S): at 05:09

## 2020-05-31 RX ADMIN — Medication 600 MILLIGRAM(S): at 20:50

## 2020-05-31 RX ADMIN — Medication 975 MILLIGRAM(S): at 12:43

## 2020-05-31 RX ADMIN — Medication 30 MILLIGRAM(S): at 09:50

## 2020-05-31 RX ADMIN — Medication 1 TABLET(S): at 12:43

## 2020-05-31 RX ADMIN — MAGNESIUM HYDROXIDE 30 MILLILITER(S): 400 TABLET, CHEWABLE ORAL at 20:53

## 2020-05-31 RX ADMIN — OXYCODONE HYDROCHLORIDE 5 MILLIGRAM(S): 5 TABLET ORAL at 20:52

## 2020-05-31 RX ADMIN — Medication 600 MILLIGRAM(S): at 15:21

## 2020-05-31 RX ADMIN — Medication 975 MILLIGRAM(S): at 23:45

## 2020-05-31 RX ADMIN — Medication 975 MILLIGRAM(S): at 18:40

## 2020-05-31 NOTE — PRE-ANESTHESIA EVALUATION ADULT - NSANTHOSAYNRD_GEN_A_CORE
No. ISABEL screening performed.  STOP BANG Legend: 0-2 = LOW Risk; 3-4 = INTERMEDIATE Risk; 5-8 = HIGH Risk

## 2020-06-01 ENCOUNTER — TRANSCRIPTION ENCOUNTER (OUTPATIENT)
Age: 30
End: 2020-06-01

## 2020-06-01 VITALS
OXYGEN SATURATION: 98 % | SYSTOLIC BLOOD PRESSURE: 124 MMHG | RESPIRATION RATE: 18 BRPM | HEART RATE: 83 BPM | DIASTOLIC BLOOD PRESSURE: 77 MMHG | TEMPERATURE: 97 F

## 2020-06-01 PROCEDURE — 59050 FETAL MONITOR W/REPORT: CPT

## 2020-06-01 PROCEDURE — 85460 HEMOGLOBIN FETAL: CPT

## 2020-06-01 PROCEDURE — 85027 COMPLETE CBC AUTOMATED: CPT

## 2020-06-01 PROCEDURE — 86901 BLOOD TYPING SEROLOGIC RH(D): CPT

## 2020-06-01 PROCEDURE — 86870 RBC ANTIBODY IDENTIFICATION: CPT

## 2020-06-01 PROCEDURE — G0463: CPT

## 2020-06-01 PROCEDURE — 86850 RBC ANTIBODY SCREEN: CPT

## 2020-06-01 PROCEDURE — 86900 BLOOD TYPING SEROLOGIC ABO: CPT

## 2020-06-01 PROCEDURE — 59025 FETAL NON-STRESS TEST: CPT

## 2020-06-01 PROCEDURE — 86780 TREPONEMA PALLIDUM: CPT

## 2020-06-01 RX ORDER — MAGNESIUM SULFATE 500 MG/ML
1 VIAL (ML) INJECTION ONCE
Refills: 0 | Status: DISCONTINUED | OUTPATIENT
Start: 2020-06-01 | End: 2020-06-01

## 2020-06-01 RX ADMIN — Medication 600 MILLIGRAM(S): at 02:37

## 2020-06-01 RX ADMIN — Medication 600 MILLIGRAM(S): at 09:54

## 2020-06-01 RX ADMIN — POLYETHYLENE GLYCOL 3350 17 GRAM(S): 17 POWDER, FOR SOLUTION ORAL at 06:41

## 2020-06-01 RX ADMIN — OXYCODONE HYDROCHLORIDE 5 MILLIGRAM(S): 5 TABLET ORAL at 03:00

## 2020-06-01 RX ADMIN — OXYCODONE HYDROCHLORIDE 5 MILLIGRAM(S): 5 TABLET ORAL at 09:54

## 2020-06-01 RX ADMIN — OXYCODONE HYDROCHLORIDE 5 MILLIGRAM(S): 5 TABLET ORAL at 13:19

## 2020-06-01 RX ADMIN — Medication 975 MILLIGRAM(S): at 06:40

## 2020-06-01 NOTE — DISCHARGE NOTE OB - MEDICATION SUMMARY - MEDICATIONS TO TAKE
I will START or STAY ON the medications listed below when I get home from the hospital:    Tylenol 325 mg oral tablet  -- 2 tab(s) by mouth every 4 hours  -- Indication: For  (normal spontaneous vaginal delivery)    ibuprofen 600 mg oral tablet  -- 1 tab(s) by mouth every 6 hours  -- Indication: For  (normal spontaneous vaginal delivery)

## 2020-06-01 NOTE — PROGRESS NOTE ADULT - SUBJECTIVE AND OBJECTIVE BOX
PA Postpartum Note- PPD#1    Prenatal Labs  Blood type: O Negative  Rubella IgG: IMMUne  RPR: Negative        S:Patient w/o complaints, pain is controlled.    Pt is OOB, tolerating PO, voiding. Lochia WNL.     O:  Vital Signs Last 24 Hrs  T(C): 36.3 (01 Jun 2020 05:00), Max: 37.4 (31 May 2020 09:00)  T(F): 97.4 (01 Jun 2020 05:00), Max: 99.3 (31 May 2020 09:00)  HR: 83 (01 Jun 2020 05:00) (77 - 118)  BP: 124/77 (01 Jun 2020 05:00) (108/62 - 141/70)  BP(mean): 85 (31 May 2020 11:05) (81 - 88)  RR: 18 (01 Jun 2020 05:00) (16 - 18)  SpO2: 98% (01 Jun 2020 05:00) (80% - 100%)     Gen: NAD  Abdomen: Soft, nontender, non-distended, fundus firm.  Vaginal: Lochia WNL,    Ext: Neg edema, Neg calf tenderness    LABS:    Hemoglobin: 11.9 g/dL (05-30 @ 16:48)      Hematocrit: 35.9 % (05-30 @ 16:48)

## 2020-06-01 NOTE — DISCHARGE NOTE OB - MATERIALS PROVIDED
[facility] Hearing Screen Program/  Immunization Record/Breastfeeding Log/Back To Sleep Handout/Breastfeeding Mother’s Support Group Information/Guide to Postpartum Care/[facility]  Screening Program/Discharge Medication Information for Patients and Families Pocket Guide/Vaccinations/Birth Certificate Instructions/Breastfeeding Guide and Packet

## 2020-06-01 NOTE — DISCHARGE NOTE OB - CARE PROVIDER_API CALL
Marisa Arora  OBSTETRICS AND GYNECOLOGY  [address] [place] [address]  Phone: [phone]  Fax: [phone]  Follow Up Time:

## 2020-06-01 NOTE — DISCHARGE NOTE OB - PATIENT PORTAL LINK FT
You can access the FollowMyHealth Patient Portal offered by [facility] by registering at the following website: [email or web address]. By joining [employer]’s FollowMyHealth portal, you will also be able to view your health information using other applications (apps) compatible with our system.

## 2020-06-01 NOTE — DISCHARGE NOTE OB - MEDICATION SUMMARY - MEDICATIONS TO STOP TAKING
I will STOP taking the medications listed below when I get home from the hospital:    oxyCODONE 5 mg oral tablet  -- 1 tab(s) by mouth every 6 hours MDD:4 tabs  -- Caution federal law prohibits the transfer of this drug to any person other  than the person for whom it was prescribed.  It is very important that you take or use this exactly as directed.  Do not skip doses or discontinue unless directed by your doctor.  May cause drowsiness.  Alcohol may intensify this effect.  Use care when operating dangerous machinery.  This prescription cannot be refilled.  Using more of this medication than prescribed may cause serious breathing problems.

## 2020-06-01 NOTE — DISCHARGE NOTE OB - CARE PLAN
Principal Discharge DX:	 (normal spontaneous vaginal delivery)  Goal:	reg diet  Assessment and plan of treatment:	nothing per vagina

## 2020-06-01 NOTE — PROGRESS NOTE ADULT - ASSESSMENT
A/P:  30y  PPD # 1   S/P  with midline episiotomy  Doing Well    PMHx: none   Current Issues: Mom Rh Neg, Baby Rh (+). For Rhogam 1 vial IM x 1. KB= 0

## 2021-01-29 ENCOUNTER — TRANSCRIPTION ENCOUNTER (OUTPATIENT)
Age: 31
End: 2021-01-29

## 2021-07-18 ENCOUNTER — TRANSCRIPTION ENCOUNTER (OUTPATIENT)
Age: 31
End: 2021-07-18

## 2021-08-27 ENCOUNTER — TRANSCRIPTION ENCOUNTER (OUTPATIENT)
Age: 31
End: 2021-08-27

## 2021-10-01 ENCOUNTER — TRANSCRIPTION ENCOUNTER (OUTPATIENT)
Age: 31
End: 2021-10-01

## 2021-11-02 ENCOUNTER — RESULT REVIEW (OUTPATIENT)
Age: 31
End: 2021-11-02

## 2021-12-06 ENCOUNTER — TRANSCRIPTION ENCOUNTER (OUTPATIENT)
Age: 31
End: 2021-12-06

## 2021-12-14 ENCOUNTER — TRANSCRIPTION ENCOUNTER (OUTPATIENT)
Age: 31
End: 2021-12-14

## 2022-04-14 NOTE — PATIENT PROFILE ADULT - ABILITY TO HEAR (WITH HEARING AID OR HEARING APPLIANCE IF NORMALLY USED):
Vascular Access Assessment and/or Insertion      Patient is in [facility] and has a history of unsuccessful vascular access attempts. Using ultrasound guidance, accessed left forearm using a 20 gauge catheter. Rapid blood return was obtained and flushes easily.     Patient tolerated procedure. Reported to ASHVIN Mays.     Adequate: hears normal conversation without difficulty

## 2022-06-12 ENCOUNTER — NON-APPOINTMENT (OUTPATIENT)
Age: 32
End: 2022-06-12

## 2022-07-13 ENCOUNTER — NON-APPOINTMENT (OUTPATIENT)
Age: 32
End: 2022-07-13

## 2022-09-13 ENCOUNTER — APPOINTMENT (OUTPATIENT)
Dept: PSYCHIATRY | Facility: CLINIC | Age: 32
End: 2022-09-13

## 2022-09-13 PROCEDURE — 90791 PSYCH DIAGNOSTIC EVALUATION: CPT | Mod: 95

## 2022-09-21 ENCOUNTER — APPOINTMENT (OUTPATIENT)
Dept: PSYCHIATRY | Facility: CLINIC | Age: 32
End: 2022-09-21

## 2022-09-21 PROCEDURE — 90837 PSYTX W PT 60 MINUTES: CPT | Mod: 95

## 2022-09-30 ENCOUNTER — APPOINTMENT (OUTPATIENT)
Dept: PSYCHIATRY | Facility: CLINIC | Age: 32
End: 2022-09-30

## 2022-09-30 PROCEDURE — 90837 PSYTX W PT 60 MINUTES: CPT | Mod: 95
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
## 2022-10-03 NOTE — END OF VISIT
[Duration of Psychotherapy Visit (minutes spent in synchronous communication): ____] : Duration of Psychotherapy Visit (minutes spent in synchronous communication): [unfilled] [Individual Psychotherapy for 53+ minutes] : Individual Psychotherapy for 53+ minutes  [Licensed Clinician] : Licensed Clinician

## 2022-10-03 NOTE — PHYSICAL EXAM
[Average] : average [Cooperative] : cooperative [Anxious] : anxious [Full] : full [Clear] : clear [Linear/Goal Directed] : linear/goal directed [WNL] : within normal limits

## 2022-10-03 NOTE — REASON FOR VISIT
[Home] : at home, [unfilled] , at the time of the visit. [Other Location: e.g. Home (Enter Location, City,State)___] : at [unfilled] [Self] : self [Patient] : Patient [FreeTextEntry2] : Geetha Bradley [FreeTextEntry1] : anxiety

## 2022-10-03 NOTE — PLAN
-See LVAD   [Psychoeducation] : Psychoeducation  [Other: ____] : [unfilled] [FreeTextEntry2] : Pt. has identified the following therapy goals thus far:\par Problem 1: Difficulty managing anger and unwanted expressions of anger (e.g., hitting )\par Goal 1: Identify triggers for and alternative responses to anger\par Goal 2: Learn 1-2 skills for modifying anger-related cognitions\par Problem 2: Difficulty managing anxiety \par Goal 1: Reduce HITESH-7 score by 30%\par Goal 2: Learn to anchor in the present (i.e., mindful emotion awareness).  [de-identified] : Session primarily focused on psychodiagnostic assessment and psychoeducation.  Pt. explained that in the last two weeks she has felt sad and experienced some loss of interest, but that her mood has overall improved over the last several weeks and these symptoms were not persistent. Pt.'s sx suggest that she is not currently experiencing a major depressive episode (MDE). Further assessment may be warranted to r/o partial remission of a MDE. Pt. reported that she consumes alcohol infrequently (2x/mo., 1-2 drinks per sitting) and does not use recreational drugs, suggesting that pt. does not meet DSM-5 criteria for AUD or CLAYTON.Pt. screened negative for a lifetime hx of charisma and psychosis (i.e., AVH, delusions of conviction), indicating that pt. also does not meet DSM-5 criteria for diagnoses of bipolar disorder or psychosis. Pt. noted that she has experienced the following symptoms for the majority of the past six months: worry, difficulty controlling worry, muscle tension, fatigue, and difficulty concentrating. Pt. also noted that she has considered herself to be a "worrier" for much of her life, and that recent stressor worsened the aforementioned sx. Taken together, pt.'s sx meet DSM-5 criteria for generalized anxiety disorder. Writer provided brief psychoeducation about pt.'s diagnosis and pt. expressed understanding. Writer also provided psychoeducation about thinking traps, given that during this appt. pt. expressed several thoughts that align with thinking traps and may therefore be contributing to pt.'s anxiety. For homework, pt. agreed to review thought distortions list. Writer and pt. will discuss thought distortions further at f/u. [Recommended Frequency of Visits: ____] : Recommended frequency of visits: [unfilled] [Return in ____ week(s)] : Return in [unfilled] week(s)

## 2022-10-07 ENCOUNTER — APPOINTMENT (OUTPATIENT)
Dept: PSYCHIATRY | Facility: CLINIC | Age: 32
End: 2022-10-07

## 2022-10-07 PROCEDURE — 90837 PSYTX W PT 60 MINUTES: CPT | Mod: 95

## 2022-10-14 ENCOUNTER — APPOINTMENT (OUTPATIENT)
Dept: PSYCHIATRY | Facility: CLINIC | Age: 32
End: 2022-10-14

## 2022-10-14 PROCEDURE — 90837 PSYTX W PT 60 MINUTES: CPT | Mod: 95
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
## 2022-10-14 NOTE — PLAN
[FreeTextEntry2] : Pt. has identified the following therapy goals thus far:\par Problem 1: Difficulty managing anger and unwanted expressions of anger (e.g., hitting )\par Goal 1: Identify triggers for and alternative responses to anger\par Goal 2: Learn 1-2 skills for modifying anger-related cognitions\par Problem 2: Difficulty managing anxiety \par Goal 1: Reduce HITESH-7 score by 30%\par Goal 2: Learn to anchor in the present (i.e., mindful emotion awareness).  [Cognitive and/or Behavior Therapy] : Cognitive and/or Behavior Therapy  [de-identified] : Pt. reported that she practiced delayed ruminative thoughts to a rumination period, and that she found this practice to be helpful for managing feelings of anger and anxiety. Pt. noted that when she experienced ruminative thoughts, she would add the thought to her "rumination list" and then immediately redirect her attention to something happening in the present. Writer reinforced pt.'s integration of present-focused awareness into her management of rumination. Time was also spent discussing psychoeducation sleep hygiene practices (e.g., not watching television in bed, not going to bed until she is ready to fall asleep), given that pt. has been having difficulty falling/staying asleep. Lastly, writer introduced a 3-column thought log as a tool for the pt. to use in the next week to further foster pt.'s recognition of thought distortions, as well as recognition of triggers for and responses to thought distortions. Pt. agreed to complete 3-column thought log for hwk (i.e., logging situation/thought/emotion when she noticed an increase in feelings of anxiety, anger, etc.).  [Recommended Frequency of Visits: ____] : Recommended frequency of visits: [unfilled] [Return in ____ week(s)] : Return in [unfilled] week(s)

## 2022-10-14 NOTE — REASON FOR VISIT
[Home] : at home, [unfilled] , at the time of the visit. [Other Location: e.g. Home (Enter Location, City,State)___] : at [unfilled] [Self] : self [FreeTextEntry2] : Geetha Bradley [Patient] : Patient [FreeTextEntry1] : anxiety
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
## 2022-10-14 NOTE — PLAN
[Cognitive and/or Behavior Therapy] : Cognitive and/or Behavior Therapy  [FreeTextEntry2] : Pt. has identified the following therapy goals thus far:\par Problem 1: Difficulty managing anger and unwanted expressions of anger (e.g., hitting )\par Goal 1: Identify triggers for and alternative responses to anger\par Goal 2: Learn 1-2 skills for modifying anger-related cognitions\par Problem 2: Difficulty managing anxiety \par Goal 1: Reduce HITESH-7 score by 30%\par \par \par Goal 2: Learn to anchor in the present (i.e., mindful emotion awareness).  [de-identified] : Pt. reviewed list of cognitive distortions for homework. Writer and pt. briefly reflected on the role that dichotomous thinking may have in pt.'s anxiety sx. Writer and pt. then began to engage in Socratic dialogue to challenge thought distortions that may be contributing to pt.'s anxiety and frustration. Pt. described certain ruminative thought processes that are impacting her daily life, and so writer introduced the skill of setting and delaying ruminations to a "rumination time". Pt. was receptive to this skill and agreed to practice scheduling rumination as a way to exert stimulus control over rumination. Throughout appt. writer also continued to validate pt.'s emotional response of grief and hurt to learning of her 's affair.  [Recommended Frequency of Visits: ____] : Recommended frequency of visits: [unfilled] [Return in ____ week(s)] : Return in [unfilled] week(s)

## 2022-10-15 ENCOUNTER — NON-APPOINTMENT (OUTPATIENT)
Age: 32
End: 2022-10-15

## 2022-10-20 ENCOUNTER — APPOINTMENT (OUTPATIENT)
Dept: PSYCHIATRY | Facility: CLINIC | Age: 32
End: 2022-10-20

## 2022-10-20 PROCEDURE — 90837 PSYTX W PT 60 MINUTES: CPT | Mod: 95

## 2022-10-20 NOTE — PLAN
[Cognitive and/or Behavior Therapy] : Cognitive and/or Behavior Therapy  [FreeTextEntry2] : Pt. has identified the following therapy goals thus far:\par Problem 1: Difficulty managing anger and unwanted expressions of anger (e.g., hitting )\par Goal 1: Identify triggers for and alternative responses to anger\par Goal 2: Learn 1-2 skills for modifying anger-related cognitions\par Problem 2: Difficulty managing anxiety \par Goal 1: Reduce HITESH-7 score by 30%\par Goal 2: Learn to anchor in the present (i.e., mindful emotion awareness).  [de-identified] : Pt. reported that she completed a 3-column thought log several times for homework. Pt. noted that she also continued practicing delaying rumination to her "rumination period", although she had greater difficulty implementing this strategy in the last week d/t several co-occurring stressors. Writer validated pt.'s emotional response to recent stressors, and noted to pt. that cognitive strategies can be more challenging to implement when overall stress is elevated. Writer and pt. then identified one cognition from pt.'s thought log to practice use of thought restructuring with. Specifically, writer engaged pt. in decatastrophizing and practiced examining evidence for and against a catastrophic thought. Pt. was receptive to Socratic dialogue. Pt. agreed to continue practicing using her 3-column thought log daily, using rumination periods where appropriate, and practicing cognitive restructuring tools (decatastrophizing, evidence for/against) one time in the next week.  [Recommended Frequency of Visits: ____] : Recommended frequency of visits: [unfilled] [Return in ____ week(s)] : Return in [unfilled] week(s)
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
## 2022-10-25 ENCOUNTER — APPOINTMENT (OUTPATIENT)
Dept: PSYCHIATRY | Facility: CLINIC | Age: 32
End: 2022-10-25

## 2022-10-25 PROCEDURE — 90837 PSYTX W PT 60 MINUTES: CPT | Mod: 95

## 2022-10-26 NOTE — PLAN
[FreeTextEntry2] : Pt. has identified the following therapy goals thus far:\par Problem 1: Difficulty managing anger and unwanted expressions of anger (e.g., hitting )\par Goal 1: Identify triggers for and alternative responses to anger\par Goal 2: Learn 1-2 skills for modifying anger-related cognitions\par Problem 2: Difficulty managing anxiety \par Goal 1: Reduce HITESH-7 score by 30%\par Goal 2: Learn to anchor in the present (i.e., mindful emotion awareness).  [Cognitive and/or Behavior Therapy] : Cognitive and/or Behavior Therapy  [de-identified] : Pt. completed the homework of using a 3-column thought log daily to identify automatic negative cognitions, using rumination periods where appropriate, and practicing cognitive restructuring tools. Specifically, pt. indicated that she practiced identifying evidence for and against automatic negative thoughts 2x in the past week. Writer and pt. reviewed pt.'s progress with using these tools to manage anxiety. Writer and pt. then collaboratively identified an alternative cognition to one of the pt.'s logged automatic negative thoughts. Pt. was receptive to Socratic dialogue. Throughout this session, writer highlighted the importance of validating the pt.'s feelings of sadness and grief about her  affair, and balancing that with acknowledgement and challenging of catastrophic/unhelpful cognitions that maybe exacerbating negative emotions (e.g., anxiety) at this time.  [Recommended Frequency of Visits: ____] : Recommended frequency of visits: [unfilled] [Return in ____ week(s)] : Return in [unfilled] week(s)
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
## 2022-11-04 ENCOUNTER — APPOINTMENT (OUTPATIENT)
Dept: PSYCHIATRY | Facility: CLINIC | Age: 32
End: 2022-11-04

## 2022-11-04 PROCEDURE — 90837 PSYTX W PT 60 MINUTES: CPT | Mod: 95

## 2022-11-07 NOTE — PLAN
[FreeTextEntry2] : Pt. has identified the following therapy goals thus far:\par Problem 1: Difficulty managing anger and unwanted expressions of anger (e.g., hitting )\par Goal 1: Identify triggers for and alternative responses to anger\par Goal 2: Learn 1-2 skills for modifying anger-related cognitions\par Problem 2: Difficulty managing anxiety \par Goal 1: Reduce HITESH-7 score by 30%\par Goal 2: Learn to anchor in the present (i.e., mindful emotion awareness).  [Cognitive and/or Behavior Therapy] : Cognitive and/or Behavior Therapy  [de-identified] : Pt. explained that she experienced an increase in feelings of sadness over the last week, and that she felt less motivated to engage in values-consistent behaviors. As a result, pt. noted that she had not exercised in six days, which pt. noted was unusual for her. Writer reviewed psychoeducation about the relation between behaviors and emotions, with particular focus on the role that behavioral activation can play in improving mood and anxiety. Pt. was receptive to this feedback, and writer and pt. agreed upon an exercise plan for the pt. to engage in during the next week. Pt.'s exercise plan for the next week was a reduced version of her typical exercise plan, which pt. agreed would increase the likelihood of her following the plan. Remainder of session focused on reviewing the thought log that pt. completed for homework. Writer provided feedback on pt.'s thought log and engaged pt. in Socratic dialogue to reinforce cognitive restructuring. For homework, pt. agreed to use a thought log to challenge unhelpful/catastrophic cognitions. [Recommended Frequency of Visits: ____] : Recommended frequency of visits: [unfilled] [Return in ____ week(s)] : Return in [unfilled] week(s)
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
## 2022-11-08 ENCOUNTER — APPOINTMENT (OUTPATIENT)
Dept: PSYCHIATRY | Facility: CLINIC | Age: 32
End: 2022-11-08

## 2022-11-08 PROCEDURE — 90837 PSYTX W PT 60 MINUTES: CPT | Mod: 95
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
## 2022-11-17 ENCOUNTER — APPOINTMENT (OUTPATIENT)
Dept: PSYCHIATRY | Facility: CLINIC | Age: 32
End: 2022-11-17

## 2022-11-17 PROCEDURE — 90837 PSYTX W PT 60 MINUTES: CPT | Mod: 95
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
## 2022-11-21 ENCOUNTER — APPOINTMENT (OUTPATIENT)
Dept: PSYCHIATRY | Facility: CLINIC | Age: 32
End: 2022-11-21

## 2022-11-21 PROCEDURE — 90837 PSYTX W PT 60 MINUTES: CPT | Mod: 95
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
## 2022-11-22 NOTE — PLAN
[FreeTextEntry2] : Pt. has identified the following therapy goals thus far:\par Problem 1: Difficulty managing anger and unwanted expressions of anger (e.g., hitting )\par Goal 1: Identify triggers for and alternative responses to anger\par Goal 2: Learn 1-2 skills for modifying anger-related cognitions\par Problem 2: Difficulty managing anxiety \par Goal 1: Reduce HITESH-7 score by 30%\par Goal 2: Learn to anchor in the present (i.e., mindful emotion awareness).  [Cognitive and/or Behavior Therapy] : Cognitive and/or Behavior Therapy  [de-identified] : Pt. reported that she had several particularly challenging days in the last week. Pt. explained that she was concerned that her  was being dishonest about something and that this lead to several days of disagreements and difficult discussions. Pt. reported that through speaking with their couples therapist and her sister in-law, she was able to effectively identify evidence for/against her belief that her  was being dishonest, and reached the conclusion that this was likely a misunderstanding. Writer reinforced pt.'s continued use of cognitive restructuring skills and openness to seeking emotional support from family. Pt. also noted that she has continued with frequent physical exercise, which helped her to manage the stress of the last week. During this appt., writer introduced the skill of anchoring in the present (building off of mindful emotion awareness exercise from [employer]). Writer and pt. agreed that engaging in this skill may help pt. to apply the concepts of nonjudgemental/present-focused awareness to her daily life, which will likely further benefit management of worry and rumination. [Recommended Frequency of Visits: ____] : Recommended frequency of visits: [unfilled] [Return in ____ week(s)] : Return in [unfilled] week(s)

## 2022-12-02 ENCOUNTER — APPOINTMENT (OUTPATIENT)
Dept: PSYCHIATRY | Facility: CLINIC | Age: 32
End: 2022-12-02

## 2022-12-02 PROCEDURE — 90837 PSYTX W PT 60 MINUTES: CPT | Mod: 95

## 2022-12-05 NOTE — PLAN
[Cognitive and/or Behavior Therapy] : Cognitive and/or Behavior Therapy  [FreeTextEntry2] : Pt. has identified the following therapy goals thus far:\par Problem 1: Difficulty managing anger and unwanted expressions of anger (e.g., hitting )\par Goal 1: Identify triggers for and alternative responses to anger\par Goal 2: Learn 1-2 skills for modifying anger-related cognitions\par Problem 2: Difficulty managing anxiety \par Goal 1: Reduce HITESH-7 score by 30%\par Goal 2: Learn to anchor in the present (i.e., mindful emotion awareness).  [de-identified] : Pt. reported that she practiced anchoring in the present throughout the week and that she found this skill to be helpful for management of anxiety. Writer reinforced pt.'s practice and discuss difficulties that pt. encountered in implementing this skill. Pt. discussed her recent increase in anxiety, and writer and pt. collaboratively identified cognitions that may be contributing to pt.'s increased anxiety/worry. For example, writer and pt. identified self-blame cognitions that the pt. was experiencing related to her 's affair. Writer engaged pt. in Socratic dialogue to challenge unrealistic and unhelpful self-blame. For homework, pt. agreed to continue anchoring in the present where appropriate, and using challenging questions to modify anxiety-related cognitions.  [Recommended Frequency of Visits: ____] : Recommended frequency of visits: [unfilled] [Return in ____ week(s)] : Return in [unfilled] week(s)

## 2022-12-09 ENCOUNTER — APPOINTMENT (OUTPATIENT)
Dept: PSYCHIATRY | Facility: CLINIC | Age: 32
End: 2022-12-09

## 2022-12-09 PROCEDURE — 90837 PSYTX W PT 60 MINUTES: CPT | Mod: 95

## 2022-12-13 NOTE — PLAN
[Cognitive and/or Behavior Therapy] : Cognitive and/or Behavior Therapy  [FreeTextEntry2] : Pt. has identified the following therapy goals thus far:\par Problem 1: Difficulty managing anger and unwanted expressions of anger (e.g., hitting )\par Goal 1: Identify triggers for and alternative responses to anger\par Goal 2: Learn 1-2 skills for modifying anger-related cognitions\par Problem 2: Difficulty managing anxiety \par Goal 1: Reduce HITESH-7 score by 30%\par Goal 2: Learn to anchor in the present (i.e., mindful emotion awareness).  [de-identified] : Pt. reported that in the last week she felt better able to identify and challenge unrealistic self-blame for her 's affair. Pt. noted that she also found it helpful to discuss these cognitions with her , who was supportive of the pt. in challenging of her self-blame. Pt. explained that in the last week she also weighed the costs and benefits of communicating with the woman who her  had an affair with. Pt. has had urges to communicate with this woman for several months, and has resisted doing so out of uncertainty about whether reopening this line of communication would help the pt. with her own anxiety and anger. Writer and pt. reviewed pt.'s pros/cons list and together collaboratively decided that pt. would benefit from delaying acting on this urge for one month. Pt. and writer agreed to reassess in one month, given that writer and pt. agreed that pt.'s urge to communicate with this woman would likely continue to decrease as it already had.  [Recommended Frequency of Visits: ____] : Recommended frequency of visits: [unfilled] [Return in ____ week(s)] : Return in [unfilled] week(s)

## 2022-12-16 ENCOUNTER — APPOINTMENT (OUTPATIENT)
Dept: PSYCHIATRY | Facility: CLINIC | Age: 32
End: 2022-12-16

## 2022-12-16 PROCEDURE — 90837 PSYTX W PT 60 MINUTES: CPT | Mod: 95

## 2022-12-27 NOTE — PLAN
[Cognitive and/or Behavior Therapy] : Cognitive and/or Behavior Therapy  [FreeTextEntry2] : Pt. has identified the following therapy goals thus far:\par Problem 1: Difficulty managing anger and unwanted expressions of anger (e.g., hitting )\par Goal 1: Identify triggers for and alternative responses to anger\par Goal 2: Learn 1-2 skills for modifying anger-related cognitions\par Problem 2: Difficulty managing anxiety \par Goal 1: Reduce HITESH-7 score by 30%\par Goal 2: Learn to anchor in the present (i.e., mindful emotion awareness).  [de-identified] : Pt. reported that she has continued to use CBT strategies to manage anxiety and rumination, which writer reinforced. Pt. noted that she has found mindful emotion awareness to be an especially useful skill. Pt. explained that she has been having ongoing interpersonal difficulties with her mother in-law. Writer and pt. discussed the challenges that pt. has faced with her mother in-law. Writer validated pt.'s emotional response to this stressors, and in doing so discussed the differences between productive and unproductive worry. For homework, pt. agreed to use thought challenging strategies to address unproductive/unrealistic worries.\par \par RTC in 2 weeks b/c writer out of office next week. [Recommended Frequency of Visits: ____] : Recommended frequency of visits: [unfilled] [Return in ____ week(s)] : Return in [unfilled] week(s)
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
## 2022-12-30 ENCOUNTER — APPOINTMENT (OUTPATIENT)
Dept: PSYCHIATRY | Facility: CLINIC | Age: 32
End: 2022-12-30
Payer: COMMERCIAL

## 2022-12-30 PROCEDURE — 90837 PSYTX W PT 60 MINUTES: CPT | Mod: 95
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
## 2022-12-30 NOTE — PLAN
[FreeTextEntry2] : Pt. has identified the following therapy goals thus far:\par Problem 1: Difficulty managing anger and unwanted expressions of anger (e.g., hitting )\par Goal 1: Identify triggers for and alternative responses to anger\par Goal 2: Learn 1-2 skills for modifying anger-related cognitions\par Problem 2: Difficulty managing anxiety \par Goal 1: Reduce HITESH-7 score by 30%\par Goal 2: Learn to anchor in the present (i.e., mindful emotion awareness).  [Cognitive and/or Behavior Therapy] : Cognitive and/or Behavior Therapy  [de-identified] : Pt. reported that she took a positive pregnancy test and that she was feeling overwhelmed by the prospect of being pregnant. Pt. indicated that she holds a goal of keeping the pregnancy and being emotionally and physically well throughout the pregnancy. GIven this, writer first validated pt.'s emotional response to this news. Writer and pt. then practiced anchoring in the present as a way to respond to pt.'s anxiety about this pregnancy. Pt. agreed that this tool will be important for managing difficult emotions and worry specifically during her pregnancy. For homework, pt. agreed to practice anchoring in the present. [Recommended Frequency of Visits: ____] : Recommended frequency of visits: [unfilled] [Return in ____ week(s)] : Return in [unfilled] week(s)

## 2023-01-06 ENCOUNTER — APPOINTMENT (OUTPATIENT)
Dept: PSYCHIATRY | Facility: CLINIC | Age: 33
End: 2023-01-06
Payer: COMMERCIAL

## 2023-01-06 PROCEDURE — 90837 PSYTX W PT 60 MINUTES: CPT | Mod: 95

## 2023-01-09 NOTE — PLAN
[FreeTextEntry2] : Pt. has identified the following therapy goals thus far:\par Problem 1: Difficulty managing anger and unwanted expressions of anger (e.g., hitting )\par Goal 1: Identify triggers for and alternative responses to anger\par Goal 2: Learn 1-2 skills for modifying anger-related cognitions\par Problem 2: Difficulty managing anxiety \par Goal 1: Reduce HITESH-7 score by 30%\par Goal 2: Learn to anchor in the present (i.e., mindful emotion awareness).  [Cognitive and/or Behavior Therapy] : Cognitive and/or Behavior Therapy  [de-identified] : Pt. reported that she has continued to feel anxious about her current pregnancy. Pt. explained that she has had difficulty implementing the anchoring in the present skill to manage anxiety. Pt. reported that she has also felt more easily frustrated with and made hurtful comments towards her , which was a behavior that she wanted to address. For homework, pt. agreed to track triggers for (i.e., antecedents to) her anger and frustration. Pt. also agreed that it would be beneficial to inform her  that she was working on modifying this behavior via therapy. Writer and pt. collaboratively then identified the automatic negative cognitions that may be contributing to pt.'s anxiety at this time. Writer engaged pt. in Decatastrophizing, which pt. was receptive to and agreed to practice for homework as well. \par \par RTC in 2 weeks b/c pt. will be on vacation next week. [Recommended Frequency of Visits: ____] : Recommended frequency of visits: [unfilled] [Return in ____ week(s)] : Return in [unfilled] week(s)

## 2023-01-17 ENCOUNTER — NON-APPOINTMENT (OUTPATIENT)
Age: 33
End: 2023-01-17

## 2023-01-19 ENCOUNTER — APPOINTMENT (OUTPATIENT)
Dept: PSYCHIATRY | Facility: CLINIC | Age: 33
End: 2023-01-19
Payer: COMMERCIAL

## 2023-01-19 PROCEDURE — 90837 PSYTX W PT 60 MINUTES: CPT | Mod: 95
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
## 2023-01-20 NOTE — REASON FOR VISIT
[Patient preference] : as per patient preference [Continuing, patient not seen in-person within last 12 months (provide details below)] : Telehealth services are continuing, patient not seen in-person within last 12 months.  [Telehealth (audio & video) - Individual/Group] : This visit was provided via telehealth using real-time 2-way audio visual technology. [Medical Office: ([facility])___] : The provider was located at the medical office in [unfilled]. [Other Location: e.g. Home (Enter Location, City,State)___] : The patient, [unfilled], was located at [unfilled] at the time of the visit. [Verbal consent obtained from patient/other participant(s)] : Verbal consent for telehealth/telephonic services obtained from patient/other participant(s) [FreeTextEntry4] : 10:00AM [FreeTextEntry5] : 10:58AM [FreeTextEntry3] : Pt. has not been seen in-person as she has preferred telehealth. [Patient] : Patient [FreeTextEntry1] : anxiety
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
## 2023-01-24 ENCOUNTER — APPOINTMENT (OUTPATIENT)
Dept: PSYCHIATRY | Facility: CLINIC | Age: 33
End: 2023-01-24
Payer: COMMERCIAL

## 2023-01-24 PROCEDURE — 90837 PSYTX W PT 60 MINUTES: CPT | Mod: 95

## 2023-01-25 NOTE — PLAN
[Cognitive and/or Behavior Therapy] : Cognitive and/or Behavior Therapy  [FreeTextEntry2] : Pt. has identified the following therapy goals thus far:\par Problem 1: Difficulty managing anger and unwanted expressions of anger (e.g., hitting )\par Goal 1: Identify triggers for and alternative responses to anger\par Goal 2: Learn 1-2 skills for modifying anger-related cognitions\par Problem 2: Difficulty managing anxiety \par Goal 1: Reduce HITESH-7 score by 30%\par Goal 2: Learn to anchor in the present (i.e., mindful emotion awareness).  [de-identified] : Pt. explained that she was effectively able to use the STOP skill in the last week, and in doing so avoided making hurtful comments to her . Pt. reported that instead, she was able to take a pause and resume communication with him when she was ready to engage in a way that better aligned with her values. As a result, pt. reported that she and her  had fewer disagreements in the last week. Writer strongly reinforced pt.'s use of this skill. Pt. indicated that although anger has had less of an impact on her daily life, she has felt more sad and anxious in the last week. Writer and pt. acknowledged the potential impact that pt.'s coming off of her SSRI may be having on her mood at this time. Writer and pt. also discussed more broadly how pt. can live in a way that is informed by her values, rather than worry and anxiety. For example, pt. identified activities that she could engage in during the next week that she may enjoy. Writer and pt. also reviewed how cognitive restructuring skills can be used to modify unhelpful and unrealistic cognitions. Pt. was receptive to Socratic dialogue in session and for homework agreed to practice cognitive restructuring. Specifically, pt. agreed to identify evidence for/against unhelpful cognitions identified in session. [Recommended Frequency of Visits: ____] : Recommended frequency of visits: [unfilled] [Return in ____ week(s)] : Return in [unfilled] week(s)

## 2023-01-25 NOTE — REASON FOR VISIT
[Patient preference] : as per patient preference [Continuing, patient not seen in-person within last 12 months (provide details below)] : Telehealth services are continuing, patient not seen in-person within last 12 months.  [Telehealth (audio & video) - Individual/Group] : This visit was provided via telehealth using real-time 2-way audio visual technology. [Other Location: e.g. Home (Enter Location, City,State)___] : The provider was located at [unfilled]. [Home] : The patient, [unfilled], was located at home, [unfilled], at the time of the visit. [Verbal consent obtained from patient/other participant(s)] : Verbal consent for telehealth/telephonic services obtained from patient/other participant(s) [Patient] : Patient [FreeTextEntry4] : 3:00PM [FreeTextEntry5] : 3:53PM [FreeTextEntry3] : Pt. has not been seen in-person as she has preferred telehealth.  [FreeTextEntry1] : anxiety and low mood
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
## 2023-01-25 NOTE — PHYSICAL EXAM
[Average] : average [Cooperative] : cooperative [Full] : full [Clear] : clear [Linear/Goal Directed] : linear/goal directed [WNL] : within normal limits [FreeTextEntry8] : Dysphoric

## 2023-01-27 ENCOUNTER — OUTPATIENT (OUTPATIENT)
Dept: OUTPATIENT SERVICES | Facility: HOSPITAL | Age: 33
LOS: 1 days | End: 2023-01-27
Payer: COMMERCIAL

## 2023-01-27 VITALS
HEIGHT: 63 IN | HEART RATE: 67 BPM | OXYGEN SATURATION: 100 % | WEIGHT: 121.92 LBS | RESPIRATION RATE: 18 BRPM | TEMPERATURE: 99 F | SYSTOLIC BLOOD PRESSURE: 107 MMHG | DIASTOLIC BLOOD PRESSURE: 77 MMHG

## 2023-01-27 DIAGNOSIS — O02.1 MISSED ABORTION: ICD-10-CM

## 2023-01-27 DIAGNOSIS — Z98.890 OTHER SPECIFIED POSTPROCEDURAL STATES: Chronic | ICD-10-CM

## 2023-01-27 DIAGNOSIS — Z90.79 ACQUIRED ABSENCE OF OTHER GENITAL ORGAN(S): Chronic | ICD-10-CM

## 2023-01-27 DIAGNOSIS — Z01.818 ENCOUNTER FOR OTHER PREPROCEDURAL EXAMINATION: ICD-10-CM

## 2023-01-27 LAB
BLD GP AB SCN SERPL QL: NEGATIVE — SIGNIFICANT CHANGE UP
HCT VFR BLD CALC: 40.2 % — SIGNIFICANT CHANGE UP (ref 34.5–45)
HGB BLD-MCNC: 13.5 G/DL — SIGNIFICANT CHANGE UP (ref 11.5–15.5)
MCHC RBC-ENTMCNC: 32.6 PG — SIGNIFICANT CHANGE UP (ref 27–34)
MCHC RBC-ENTMCNC: 33.6 GM/DL — SIGNIFICANT CHANGE UP (ref 32–36)
MCV RBC AUTO: 97.1 FL — SIGNIFICANT CHANGE UP (ref 80–100)
NRBC # BLD: 0 /100 WBCS — SIGNIFICANT CHANGE UP (ref 0–0)
PLATELET # BLD AUTO: 275 K/UL — SIGNIFICANT CHANGE UP (ref 150–400)
RBC # BLD: 4.14 M/UL — SIGNIFICANT CHANGE UP (ref 3.8–5.2)
RBC # FLD: 12.5 % — SIGNIFICANT CHANGE UP (ref 10.3–14.5)
RH IG SCN BLD-IMP: NEGATIVE — SIGNIFICANT CHANGE UP
SARS-COV-2 RNA SPEC QL NAA+PROBE: SIGNIFICANT CHANGE UP
WBC # BLD: 6.21 K/UL — SIGNIFICANT CHANGE UP (ref 3.8–10.5)
WBC # FLD AUTO: 6.21 K/UL — SIGNIFICANT CHANGE UP (ref 3.8–10.5)

## 2023-01-27 PROCEDURE — G0463: CPT

## 2023-01-27 PROCEDURE — 86901 BLOOD TYPING SEROLOGIC RH(D): CPT

## 2023-01-27 PROCEDURE — U0005: CPT

## 2023-01-27 PROCEDURE — U0003: CPT

## 2023-01-27 PROCEDURE — 86900 BLOOD TYPING SEROLOGIC ABO: CPT

## 2023-01-27 PROCEDURE — 86850 RBC ANTIBODY SCREEN: CPT

## 2023-01-27 PROCEDURE — 85027 COMPLETE CBC AUTOMATED: CPT

## 2023-01-27 RX ORDER — ACETAMINOPHEN 500 MG
2 TABLET ORAL
Qty: 0 | Refills: 0 | DISCHARGE

## 2023-01-27 RX ORDER — SODIUM CHLORIDE 9 MG/ML
1000 INJECTION, SOLUTION INTRAVENOUS
Refills: 0 | Status: DISCONTINUED | OUTPATIENT
Start: 2023-01-30 | End: 2023-02-13

## 2023-01-27 RX ORDER — IBUPROFEN 200 MG
1 TABLET ORAL
Qty: 0 | Refills: 0 | DISCHARGE

## 2023-01-27 NOTE — H&P PST ADULT - NSICDXPASTMEDICALHX_GEN_ALL_CORE_FT
PAST MEDICAL HISTORY:  Ectopic pregnancy -s/p MTX with +Hcg and abdominal pain    Eczema     History of vertigo     Mild anemia     Missed

## 2023-01-27 NOTE — H&P PST ADULT - FUNCTIONAL STATUS
greater than 10 METS very active, able to walk uphill without stopping, DASI score 8.79/greater than 10 METS

## 2023-01-27 NOTE — H&P PST ADULT - ATTENDING COMMENTS
33 yo P1, hx ectopic and salpingectomy in past, now with missed Ab, size 7w.  Pros/cons D&C vs observation reviewed in detail and pt elected to proceed. 31 yo P1, hx ectopic and salpingectomy in past, now with missed Ab, size 7w.  Pros/cons D&C vs observation reviewed in detail and pt elected to proceed. Type Oneg, will need Rhogam.

## 2023-01-27 NOTE — H&P PST ADULT - HISTORY OF PRESENT ILLNESS
covid-1/2022 32 year old female, , LMP 2022, with h/o anxiety/ depressive disorders (no medication at present), covid-19 infection 2022 (no hospitalization), ectopic pregnancy , mild anemia, vertigo (unknown cause, had work up), eczema, presents for preop testing for scheduled D&C for missed  at 8 weeks gestation.  covid-10 PCR test done at PST 2023

## 2023-01-27 NOTE — H&P PST ADULT - NSICDXPASTSURGICALHX_GEN_ALL_CORE_FT
PAST SURGICAL HISTORY:  H/O dilation and curettage     H/O unilateral salpingectomy     History of colonoscopy

## 2023-01-29 ENCOUNTER — TRANSCRIPTION ENCOUNTER (OUTPATIENT)
Age: 33
End: 2023-01-29

## 2023-01-30 ENCOUNTER — TRANSCRIPTION ENCOUNTER (OUTPATIENT)
Age: 33
End: 2023-01-30

## 2023-01-30 ENCOUNTER — OUTPATIENT (OUTPATIENT)
Dept: OUTPATIENT SERVICES | Facility: HOSPITAL | Age: 33
LOS: 1 days | Discharge: HOME | End: 2023-01-30
Payer: COMMERCIAL

## 2023-01-30 VITALS
OXYGEN SATURATION: 100 % | SYSTOLIC BLOOD PRESSURE: 95 MMHG | HEART RATE: 58 BPM | DIASTOLIC BLOOD PRESSURE: 51 MMHG | RESPIRATION RATE: 14 BRPM | TEMPERATURE: 98 F

## 2023-01-30 VITALS
RESPIRATION RATE: 15 BRPM | WEIGHT: 121.92 LBS | OXYGEN SATURATION: 100 % | SYSTOLIC BLOOD PRESSURE: 99 MMHG | DIASTOLIC BLOOD PRESSURE: 53 MMHG | HEART RATE: 59 BPM | TEMPERATURE: 98 F | HEIGHT: 63 IN

## 2023-01-30 DIAGNOSIS — Z01.818 ENCOUNTER FOR OTHER PREPROCEDURAL EXAMINATION: ICD-10-CM

## 2023-01-30 DIAGNOSIS — Z98.890 OTHER SPECIFIED POSTPROCEDURAL STATES: Chronic | ICD-10-CM

## 2023-01-30 DIAGNOSIS — Z90.79 ACQUIRED ABSENCE OF OTHER GENITAL ORGAN(S): Chronic | ICD-10-CM

## 2023-01-30 DIAGNOSIS — O02.1 MISSED ABORTION: ICD-10-CM

## 2023-01-30 PROCEDURE — 88280 CHROMOSOME KARYOTYPE STUDY: CPT

## 2023-01-30 PROCEDURE — 88305 TISSUE EXAM BY PATHOLOGIST: CPT | Mod: 26

## 2023-01-30 PROCEDURE — 88264 CHROMOSOME ANALYSIS 20-25: CPT

## 2023-01-30 PROCEDURE — 88233 TISSUE CULTURE SKIN/BIOPSY: CPT

## 2023-01-30 PROCEDURE — 59820 CARE OF MISCARRIAGE: CPT

## 2023-01-30 PROCEDURE — 88305 TISSUE EXAM BY PATHOLOGIST: CPT

## 2023-01-30 RX ORDER — ONDANSETRON 8 MG/1
4 TABLET, FILM COATED ORAL ONCE
Refills: 0 | Status: DISCONTINUED | OUTPATIENT
Start: 2023-01-30 | End: 2023-02-13

## 2023-01-30 RX ORDER — SODIUM CHLORIDE 9 MG/ML
1000 INJECTION, SOLUTION INTRAVENOUS
Refills: 0 | Status: DISCONTINUED | OUTPATIENT
Start: 2023-01-30 | End: 2023-02-13

## 2023-01-30 RX ORDER — OXYCODONE HYDROCHLORIDE 5 MG/1
5 TABLET ORAL ONCE
Refills: 0 | Status: DISCONTINUED | OUTPATIENT
Start: 2023-01-30 | End: 2023-01-30

## 2023-01-30 RX ADMIN — SODIUM CHLORIDE 100 MILLILITER(S): 9 INJECTION, SOLUTION INTRAVENOUS at 12:18

## 2023-01-30 NOTE — ASU DISCHARGE PLAN (ADULT/PEDIATRIC) - ASU DC SPECIAL INSTRUCTIONSFT
Regular diet as tolerated, regular activity as tolerated, no heavy lifting for first two weeks.  Nothing per vagina: no intercourse, tampons or douching.  Call your provider if you experience fevers, chills, worsening abdominal pain, inability to urinate or worsening vaginal bleeding.

## 2023-01-30 NOTE — ASU DISCHARGE PLAN (ADULT/PEDIATRIC) - NS MD DC FALL RISK RISK
For information on Fall & Injury Prevention, visit: [email or web address] OR  [email or web address] OR  https://www.cdc.gov/steadi/patient.html

## 2023-01-30 NOTE — ASU DISCHARGE PLAN (ADULT/PEDIATRIC) - CARE PROVIDER_API CALL
Ramon Vitale (MD)  Obstetrics and Gynecology  [address], [address]  [place] [id number]  Phone: [phone]  Fax: [phone]  Follow Up Time:

## 2023-01-30 NOTE — BRIEF OPERATIVE NOTE - TYPE OF ANESTHESIA
[facility] Hospital Medicine  Progress Note    Patient Name: Tenzin Ortiz  MRN: [id number]  Patient Class: OP- Observation   Admission Date: 2/7/2018  Length of Stay: 0 days  Attending Physician: Olivia Del Rosario MD  Primary Care Provider: Efrain Chavez MD        Subjective:     Principal Problem:Debility    HPI:  Patient is 74 yo male with HTN, HLD, and Parkinson's who presents to ED reportedly for HA. Per ED note patient was complaining of HA but he denies this on my interview. Seems there was some confusion in ED and he was unclear why he was here. During interview with me, patient reports being here for NH placement although he does not appear to be very happy about this. He has been with some issues caring for himself. Currently lives with his girlfriend who reports she can no longer care for him. He states he currently gets around with a walker but sometimes has difficulty getting up out of his chair. Per chart review patient is with Parkinson's and sees neurology, Dr. Castellanos, lastly seen on 2/6/18. He was with hallucinations which were noted to be secondary to dopaminergic agents thus having to significantly reduce dose--she recommended he be admitted for placement at that time but patient declined. He has been attempting placement as an outpatient through PCP but without much luck. He otherwise denies recent illness, fever/chills, CP, SOB, abdominal pain, N/V/D, dysuria. On presentation patient with grossly normal labs/vitals. Placed in observation for what appears to be solely placement although this is something that may need to be continued as outpatient as is not indication for hospitalization.    Hospital Course:  Awake and alert without complaint - severe parkinsons disease and can no longer care for himself - neither can his girlfriend.  CM reports patient with acceptance to [place]; awaiting legals to be signs by patient POA. No new issues - case management  following    Interval History: No acute events reported. Plan of care discussed with patient. He is awake and alert. However, pleasantly confused. Denies pain or any new issues today.  CM following; awaiting disposition to [facility] once legals have been signed per POA. PT following for ambulation.    Review of Systems   Constitutional: Negative for activity change, appetite change, chills, diaphoresis, fatigue and fever.   Respiratory: Negative for cough, chest tightness, shortness of breath and wheezing.    Cardiovascular: Negative for chest pain, palpitations and leg swelling.   Gastrointestinal: Negative for abdominal distention, abdominal pain, constipation, diarrhea, nausea and vomiting.   Genitourinary: Negative for dysuria and hematuria.   Musculoskeletal: Negative for joint swelling and neck stiffness.   Neurological: Positive for tremors. Negative for weakness.   Psychiatric/Behavioral: Positive for confusion.        Intermittent confusion, requires reorienting - mood swings have stabilized - chronic essential tremors - alert to self and place     Objective:     Vital Signs (Most Recent):  Temp: 98 °F (36.7 °C) (02/23/18 0825)  Pulse: 75 (02/23/18 0825)  Resp: 18 (02/23/18 0825)  BP: 129/62 (02/23/18 0825)  SpO2: 97 % (02/23/18 0825) Vital Signs (24h Range):  Temp:  [97.7 °F (36.5 °C)-98.4 °F (36.9 °C)] 98 °F (36.7 °C)  Pulse:  [75-91] 75  Resp:  [16-20] 18  SpO2:  [96 %-98 %] 97 %  BP: (129-162)/(62-69) 129/62     Weight: 63.8 kg (140 lb 10.5 oz)  Body mass index is 22.03 kg/m².    Intake/Output Summary (Last 24 hours) at 02/23/18 1411  Last data filed at 02/23/18 0456   Gross per 24 hour   Intake              360 ml   Output              500 ml   Net             -140 ml      Physical Exam   Constitutional: He appears well-developed and well-nourished. No distress.   HENT:   Head: Normocephalic and atraumatic.   Eyes: Pupils are equal, round, and reactive to light.   Wears glasses; at the bedside   Neck:  Normal range of motion. Neck supple.   Cardiovascular: Normal rate.    Pulmonary/Chest: Effort normal. No respiratory distress. He has no wheezes. He has no rales.   Abdominal: Soft. He exhibits no distension. There is no tenderness.   Genitourinary: Rectal exam shows guaiac negative stool.   Musculoskeletal: Normal range of motion. He exhibits no edema or tenderness.   Neurological: He is alert.   Confusion requires reorienting however, alert to self and situation - chronic essential tremors   Skin: Skin is warm and dry. He is not diaphoretic.   Dry skin; poor turgor   Psychiatric:   More awake today - answers questions appropriately       Significant Labs: All pertinent labs within the past 24 hours have been reviewed.    Significant Imaging: I have reviewed all pertinent imaging results/findings within the past 24 hours.  I have reviewed and interpreted all pertinent imaging results/findings within the past 24 hours.    Assessment/Plan:      * Debility    Currently awaiting placement  PT/OT following for ambulation  PPD already placed and read last week 2/7/18  Case management following  Lovenox 40 mg daily      TN consulted to aid in safe discharge planning. Appears patient has been living with a significant other who can no longer care for him and refusing to take patient home. His somewhat estranged daughter has been contacted and is willing to aid in finding NH placement (she had already begun this process as an outpatient). There is an issue, however, in that patient's power of  refused to sign consent for NH placement. Patient unable to make his own decision regarding this as he is with intermittent confusion +/- previous unclear dementia diagnosis per daughter. Psychiatry consulted for capacity. Also recommended adding depakote 250mg TID to help with mood stabilization. He is medically cleared and was initially with plan for home with HH and SW to aid in outpatient placement, however, cannot safely  discharge as he does not have a home to return to.           Lewy body dementia with behavioral disturbance    [facility]en by [facility] who has since signed off - appreciate recs -  Doing well on current regimen  Continue medication regimen as follows:  Sinemet  QID and Seroquel 25 hs  Continue Exelon 4.6 mg/24 patch  Divalproex 250 mg to BID  Continue holding trazadone        Dehydration    Per ED patient admitted for dehydration. His BUN is elevated with normal creatinine--may be mild clinical dehydration. Will provide gentle IVF hydration.           Essential hypertension    BP well controlled - Continue amlodipine 10 mg daily  Change vitals to every shift            Parkinson disease    Seroquel 25 mg; sinemet  mg;   Discontinue taxodione 50 mg  Restart exelon 4.6 mg in the morning when he is more awake          VTE Risk Mitigation         Ordered     enoxaparin injection 40 mg  Daily     Route:  Subcutaneous        02/13/18 1149     Medium Risk of VTE  Once      02/08/18 0653     Place JOSEFINA hose  Until discontinued      02/08/18 0653     Place sequential compression device  Until discontinued      02/08/18 0653              ANA Dobson  Department of Hospital Medicine   [facility]   General

## 2023-01-30 NOTE — BRIEF OPERATIVE NOTE - OPERATION/FINDINGS
EUA revealed normal external genitalia, normal vaginal mucosa, normal cervix, 8 wk size retroverted uterus. No adnexal fullness. Dilation and suction curettage performed. POCs sent to pathology. Excellent hemostasis noted.

## 2023-01-30 NOTE — BRIEF OPERATIVE NOTE - NSICDXBRIEFPROCEDURE_GEN_ALL_CORE_FT
PROCEDURES:  D&C, uterus, therapeutic, for incomplete, missed, septic, or induced  2023 15:21:06  Karin Coles

## 2023-01-30 NOTE — ASU PATIENT PROFILE, ADULT - FALL HARM RISK - UNIVERSAL INTERVENTIONS
Bed in lowest position, wheels locked, appropriate side rails in place/Call bell, personal items and telephone in reach/Instruct patient to call for assistance before getting out of bed or chair/Non-slip footwear when patient is out of bed/[place] to call system/Physically safe environment - no spills, clutter or unnecessary equipment/Purposeful Proactive Rounding/Room/bathroom lighting operational, light cord in reach

## 2023-01-30 NOTE — ASU DISCHARGE PLAN (ADULT/PEDIATRIC) - NURSING INSTRUCTIONS
You received a dose of Tylenol today at 2:45 PM. If needed, next dose time is 8:45 PM this evening. Do not exceed 4,000 mg of Tylenol in a 24 hour period. You received a dose of Toradol (motrin or ibuprofen) today at 3 PM. If needed, next dose time is 9 PM this evening. Please eat before taking this medication.

## 2023-01-30 NOTE — PRE-ANESTHESIA EVALUATION ADULT - BP NONINVASIVE DIASTOLIC (MM HG)
52 PAST MEDICAL HISTORY:  Fracture, metacarpal left fifth    H/O orchitis     No pertinent past medical history

## 2023-02-01 ENCOUNTER — APPOINTMENT (OUTPATIENT)
Dept: PSYCHIATRY | Facility: CLINIC | Age: 33
End: 2023-02-01
Payer: COMMERCIAL

## 2023-02-01 PROCEDURE — 90837 PSYTX W PT 60 MINUTES: CPT | Mod: 95

## 2023-02-02 PROBLEM — O02.1 MISSED ABORTION: Chronic | Status: ACTIVE | Noted: 2023-01-27

## 2023-02-02 PROBLEM — L30.9 DERMATITIS, UNSPECIFIED: Chronic | Status: ACTIVE | Noted: 2023-01-27

## 2023-02-02 PROBLEM — D64.9 ANEMIA, UNSPECIFIED: Chronic | Status: ACTIVE | Noted: 2023-01-27

## 2023-02-02 PROBLEM — Z87.898 PERSONAL HISTORY OF OTHER SPECIFIED CONDITIONS: Chronic | Status: ACTIVE | Noted: 2023-01-27

## 2023-02-02 NOTE — REASON FOR VISIT
[Patient preference] : as per patient preference [Continuing, patient not seen in-person within last 12 months (provide details below)] : Telehealth services are continuing, patient not seen in-person within last 12 months.  [Medical Office: ([facility])___] : The provider was located at the medical office in [unfilled]. [Home] : The patient, [unfilled], was located at home, [unfilled], at the time of the visit. [Verbal consent obtained from patient/other participant(s)] : Verbal consent for telehealth/telephonic services obtained from patient/other participant(s) [Patient] : Patient [FreeTextEntry4] : 10:00AM [FreeTextEntry5] : 10:56AM [FreeTextEntry3] : Pt. has not been seen in-person as she has preferred telehealth. [FreeTextEntry1] : anxiety; marital discord
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
## 2023-02-02 NOTE — PLAN
[Cognitive and/or Behavior Therapy] : Cognitive and/or Behavior Therapy  [FreeTextEntry2] : Pt. has identified the following therapy goals thus far:\par Problem 1: Difficulty managing anger and unwanted expressions of anger (e.g., hitting )\par Goal 1: Identify triggers for and alternative responses to anger\par Goal 2: Learn 1-2 skills for modifying anger-related cognitions\par Problem 2: Difficulty managing anxiety \par Goal 1: Reduce HITESH-7 score by 30%\par Goal 2: Learn to anchor in the present (i.e., mindful emotion awareness).  [de-identified] : Pt. explained that since her last appointment she was informed by her OB that her pregnancy was not viable. Pt. indicated that she underwent a d&c several days prior to this appt. with writer. Pt. expressed feelings of sadness, frustration, and relief in response to her pregnancy loss. Writer validated pt.'s emotional response to her loss. Pt. noted that the increased anxiety and frustration she experienced during her pregnancy had lead her to reevaluate how she feels about continuing to work on her relationship with her . Writer and pt. collaboratively decided that, consistent with the recommendation of pt.'s couples therapist, it would be best for pt. not to make any significant decisions about her marriage so soon after the pregnancy loss. Writer and pt. therefore agreed to refocus pt.'s attention towards considering how she can care for herself in the next week. For example, pt. agreed to get a massage and exercise in the next week. [Recommended Frequency of Visits: ____] : Recommended frequency of visits: [unfilled] [Return in ____ week(s)] : Return in [unfilled] week(s)

## 2023-02-06 LAB — SURGICAL PATHOLOGY STUDY: SIGNIFICANT CHANGE UP

## 2023-02-10 ENCOUNTER — APPOINTMENT (OUTPATIENT)
Dept: PSYCHIATRY | Facility: CLINIC | Age: 33
End: 2023-02-10
Payer: COMMERCIAL

## 2023-02-10 PROCEDURE — 90834 PSYTX W PT 45 MINUTES: CPT | Mod: 95

## 2023-02-13 NOTE — REASON FOR VISIT
[Patient preference] : as per patient preference [Continuing, patient not seen in-person within last 12 months (provide details below)] : Telehealth services are continuing, patient not seen in-person within last 12 months.  [Telehealth (audio & video) - Couple/Family] : This visit was provided via telehealth using real-time 2-way audio visual technology.  [Other Location: e.g. Home (Enter Location, City,State)___] : The provider was located at [unfilled]. [Home] : The patient, [unfilled], was located at home, [unfilled], at the time of the visit. [Verbal consent obtained from patient/other participant(s)] : Verbal consent for telehealth/telephonic services obtained from patient/other participant(s) [Patient] : Patient [FreeTextEntry4] : 10:05AM [FreeTextEntry5] : 10:57AM [FreeTextEntry3] : Pt. has not been seen in-person as she has preferred telehealth [FreeTextEntry1] : anxiety; marital discord
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
## 2023-02-14 ENCOUNTER — APPOINTMENT (OUTPATIENT)
Dept: PSYCHIATRY | Facility: CLINIC | Age: 33
End: 2023-02-14
Payer: COMMERCIAL

## 2023-02-14 PROCEDURE — 90837 PSYTX W PT 60 MINUTES: CPT | Mod: 95
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
## 2023-02-15 NOTE — REASON FOR VISIT
[Patient preference] : as per patient preference [Continuing, patient not seen in-person within last 12 months (provide details below)] : Telehealth services are continuing, patient not seen in-person within last 12 months.  [Telehealth (audio & video) - Individual/Group] : This visit was provided via telehealth using real-time 2-way audio visual technology. [Other Location: e.g. Home (Enter Location, City,State)___] : The provider was located at [unfilled]. [Verbal consent obtained from patient/other participant(s)] : Verbal consent for telehealth/telephonic services obtained from patient/other participant(s) [FreeTextEntry4] : 1:00PM [FreeTextEntry5] : 1:59PM [FreeTextEntry3] : Pt. has not been seen in-person as she has preferred telehealth. [Patient] : Patient [FreeTextEntry1] : Anxiety; marital discord
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
## 2023-02-21 ENCOUNTER — APPOINTMENT (OUTPATIENT)
Dept: PSYCHIATRY | Facility: CLINIC | Age: 33
End: 2023-02-21
Payer: COMMERCIAL

## 2023-02-21 PROCEDURE — 90837 PSYTX W PT 60 MINUTES: CPT | Mod: 95
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
## 2023-02-22 NOTE — PLAN
[Cognitive and/or Behavior Therapy] : Cognitive and/or Behavior Therapy  [FreeTextEntry2] : Pt. has identified the following therapy goals thus far:\par Problem 1: Difficulty managing anger and unwanted expressions of anger (e.g., hitting )\par Goal 1: Identify triggers for and alternative responses to anger\par Goal 2: Learn 1-2 skills for modifying anger-related cognitions\par Problem 2: Difficulty managing anxiety \par Goal 1: Reduce HITESH-7 score by 30%\par Goal 2: Learn to anchor in the present (i.e., mindful emotion awareness). \par Goal 3: Learn 2-3 skills for modifying unhelpful cognitions [de-identified] : Pt. indicated that she and her  were considering an in-house separation, which they planned to discuss with their couples therapist. Pt. explained that she has felt exhausted by efforts to mend her relationship with her  since having learned of his affair. Pt. and writer agreed that whether or not pt. decides to stay with her , it would be important to continue to work on challenging unrealistic self-blame associated with his affair given that her self-blame is associated with painful emotions and motivates hypervigilant behaviors in her relationship. To that end, writer and pt. reviewed pt.'s completed homework, which was to identify evidence for/against one of her self-blame beliefs. Pt. was receptive to Socratic dialogue. Writer and pt. also collaboratively weighed the costs and benefits of the pt.'s continued efforts to exert control over her 's behaviors, as well as her belief that if she controls his behaviors she can prevent him from making bad decisions again in the future. For homework, pt. agreed to challenge another self-blame belief about her 's affair. [Recommended Frequency of Visits: ____] : Recommended frequency of visits: [unfilled] [Return in ____ week(s)] : Return in [unfilled] week(s)

## 2023-02-22 NOTE — REASON FOR VISIT
[Patient preference] : as per patient preference [Continuing, patient not seen in-person within last 12 months (provide details below)] : Telehealth services are continuing, patient not seen in-person within last 12 months.  [Telehealth (audio & video) - Individual/Group] : This visit was provided via telehealth using real-time 2-way audio visual technology. [Other Location: e.g. Home (Enter Location, City,State)___] : The provider was located at [unfilled]. [Home] : The patient, [unfilled], was located at home, [unfilled], at the time of the visit. [Verbal consent obtained from patient/other participant(s)] : Verbal consent for telehealth/telephonic services obtained from patient/other participant(s) [Patient] : Patient [FreeTextEntry4] : 3:00PM [FreeTextEntry5] : 3:55PM [FreeTextEntry3] : Pt. has not been seen in-person as she has preferred telehealth. [FreeTextEntry1] : Anxiety, marital discord

## 2023-02-23 LAB — CHROM ANALY OVERALL INTERP SPEC-IMP: SIGNIFICANT CHANGE UP

## 2023-03-03 ENCOUNTER — APPOINTMENT (OUTPATIENT)
Dept: PSYCHIATRY | Facility: CLINIC | Age: 33
End: 2023-03-03
Payer: COMMERCIAL

## 2023-03-03 DIAGNOSIS — F43.22 ADJUSTMENT DISORDER WITH ANXIETY: ICD-10-CM

## 2023-03-03 PROCEDURE — 90837 PSYTX W PT 60 MINUTES: CPT | Mod: 95

## 2023-03-06 PROBLEM — F43.22 ADJUSTMENT DISORDER WITH ANXIOUS MOOD: Status: ACTIVE | Noted: 2022-09-15

## 2023-03-06 NOTE — REASON FOR VISIT
[Patient preference] : as per patient preference [Continuing, patient not seen in-person within last 12 months (provide details below)] : Telehealth services are continuing, patient not seen in-person within last 12 months.  [Telehealth (audio & video) - Individual/Group] : This visit was provided via telehealth using real-time 2-way audio visual technology. [Other Location: e.g. Home (Enter Location, City,State)___] : The provider was located at [unfilled]. [Home] : The patient, [unfilled], was located at home, [unfilled], at the time of the visit. [Verbal consent obtained from patient/other participant(s)] : Verbal consent for telehealth/telephonic services obtained from patient/other participant(s) [Patient] : Patient [FreeTextEntry4] : 2:00PM [FreeTextEntry5] : 2:55PM [FreeTextEntry3] : Pt. has not been seen in-person as she has preferred telehealth. [FreeTextEntry1] : Anxiety

## 2023-03-06 NOTE — PHYSICAL EXAM
[Average] : average [Cooperative] : cooperative [Anxious] : anxious [Clear] : clear [Full] : full [Linear/Goal Directed] : linear/goal directed [WNL] : within normal limits

## 2023-03-06 NOTE — PLAN
[Cognitive and/or Behavior Therapy] : Cognitive and/or Behavior Therapy  [Motivational Interviewing] : Motivational Interviewing  [Recommended Frequency of Visits: ____] : Recommended frequency of visits: [unfilled] [Return in ____ week(s)] : Return in [unfilled] week(s) [FreeTextEntry2] : Pt. has identified the following therapy goals thus far:\par Problem 1: Difficulty managing anger and unwanted expressions of anger (e.g., hitting )\par Goal 1: Identify triggers for and alternative responses to anger\par Goal 2: Learn 1-2 skills for modifying anger-related cognitions\par Problem 2: Difficulty managing anxiety \par Goal 1: Reduce HITESH-7 score by 30%\par Goal 2: Learn to anchor in the present (i.e., mindful emotion awareness). \par Goal 3: Learn 2-3 skills for modifying unhelpful cognitions\par \par  [de-identified] : Pt. reported that she completed the homework, which was to use disputing questions (i.e., evidence for/against) to challenge another self-blame belief related to her 's affair. Writer and pt. reviewed pt.'s complete homework, and in particular engaged in Socratic dialogue to reach a more realistic alternative belief. Pt. was again receptive to Socratic dialogue. During this appt., writer also engaged pt. in Motivational Interviewing to explore how pursuing a separation from her  aligns with her goals and values. Through MI, pt. identified a specific next step that she can take that would not necessarily involve separation but would allow her to test the hypothesis that she would be happier if she had more time to herself without her  present.

## 2023-03-10 ENCOUNTER — APPOINTMENT (OUTPATIENT)
Dept: PSYCHIATRY | Facility: CLINIC | Age: 33
End: 2023-03-10
Payer: COMMERCIAL

## 2023-03-10 PROCEDURE — 90837 PSYTX W PT 60 MINUTES: CPT | Mod: 95

## 2023-03-10 NOTE — PLAN
[Cognitive and/or Behavior Therapy] : Cognitive and/or Behavior Therapy  [FreeTextEntry2] : Pt. has identified the following therapy goals thus far:\par Problem 1: Difficulty managing anger and unwanted expressions of anger (e.g., hitting )\par Goal 1: Identify triggers for and alternative responses to anger\par Goal 2: Learn 1-2 skills for modifying anger-related cognitions\par Problem 2: Difficulty managing anxiety \par Goal 1: Reduce HITESH-7 score by 30%\par Goal 2: Learn to anchor in the present (i.e., mindful emotion awareness). \par Goal 3: Learn 2-3 skills for modifying unhelpful cognitions\par \par \par  [de-identified] : Pt. explained that she did not complete the homework of challenging another self-blame belief related to her 's affair.  (i.e., writing out evidence for and against; identifying an alternative belief). Pt. explained, however, that she had been carefully reviewing all of the support and affection that she provided her  with prior to his affair. Pt. reported that she she was beginning to see more clearly that her self-blame beliefs related to her 's affair are unrealistic and unhelpful. Writer and pt. discussed the meaning associated with letting go of the belief that she is to blame for her 's affair. Pt. explained that doing so would mean that she is not in control of her 's behaviors. Pt. explained that considering this possibility lead her to feel deeply sad. Writer reinforced pt.'s movement towards a more realistic and helpful perspective on power/control (I.e., that she can influence her relationship but cannot control all of the outcomes within her relationship), and normalized feelings of grief and sadness associated w/this acceptance. To help pt. cope with this acceptance, writer and pt. identified several pleasurable activities for pt. to schedule into her week. Writer and pt. agreed at f/u to further discuss controlling behaviors that pt. may modify as a result of this changed belief. [Recommended Frequency of Visits: ____] : Recommended frequency of visits: [unfilled] [Return in ____ week(s)] : Return in [unfilled] week(s)

## 2023-03-10 NOTE — REASON FOR VISIT
[Patient preference] : as per patient preference [Continuing, patient not seen in-person within last 12 months (provide details below)] : Telehealth services are continuing, patient not seen in-person within last 12 months.  [Telehealth (audio & video) - Individual/Group] : This visit was provided via telehealth using real-time 2-way audio visual technology. [Other Location: e.g. Home (Enter Location, City,State)___] : The provider was located at [unfilled]. [Home] : The patient, [unfilled], was located at home, [unfilled], at the time of the visit. [Verbal consent obtained from patient/other participant(s)] : Verbal consent for telehealth/telephonic services obtained from patient/other participant(s) [Patient] : Patient [FreeTextEntry4] : 11:00AM [FreeTextEntry5] : 11:54AM [FreeTextEntry3] : Pt. has not been seen in-person as she has preferred telehealth. [FreeTextEntry1] : anxiety and low mood

## 2023-03-15 ENCOUNTER — APPOINTMENT (OUTPATIENT)
Dept: PSYCHIATRY | Facility: CLINIC | Age: 33
End: 2023-03-15
Payer: COMMERCIAL

## 2023-03-15 DIAGNOSIS — R45.89 OTHER SYMPTOMS AND SIGNS INVOLVING EMOTIONAL STATE: ICD-10-CM

## 2023-03-15 PROCEDURE — 90837 PSYTX W PT 60 MINUTES: CPT | Mod: 95

## 2023-03-16 PROBLEM — R45.89 SYMPTOMS OF DEPRESSION: Status: ACTIVE | Noted: 2023-03-16

## 2023-03-16 NOTE — PLAN
[Cognitive and/or Behavior Therapy] : Cognitive and/or Behavior Therapy  [Motivational Interviewing] : Motivational Interviewing  [FreeTextEntry2] : Pt. has identified the following therapy goals thus far:\par Problem 1: Difficulty managing anger and unwanted expressions of anger (e.g., hitting )\par Goal 1: Identify triggers for and alternative responses to anger\par Goal 2: Learn 1-2 skills for modifying anger-related cognitions\par Problem 2: Difficulty managing anxiety \par Goal 1: Reduce HITESH-7 score by 30%\par Goal 2: Learn to anchor in the present (i.e., mindful emotion awareness). \par Goal 3: Learn 2-3 skills for modifying unhelpful cognitions\par \par \par  [de-identified] : Pt. reported that she has felt persistent sadness in the week since her last meeting with writer. Pt. explained that she was feeling increased uncertainty about whether to remain in her marriage. Pt. reported that acceptance of the lack of control that she has over her 's behavior has contributed to this increased uncertainty and sadness. Writer engaged pt. in Motivational Interviewing to collaboratively examine how remaining in her marriage aligns with her goals and values. Through MI, pt. identified that the benefits of staying in her marriage outweigh the potential costs. Writer and pt. therefore focused on identifying specific steps pt. can take towards decreasing her feelings of sadness. Pt. identified that learning to forgive her  would be important for her at this time, and so writer and pt. began to discuss what forgiveness would look like for the pt. To this end, for homework pt. agreed that when she began to feel angry or resentful towards her , to practice refocusing her attention to the behaviors that her  has changed and what she is grateful for in their relationship.\par \par RTC in 2 weeks b/c writer out of office next week. [Recommended Frequency of Visits: ____] : Recommended frequency of visits: [unfilled] [Return in ____ week(s)] : Return in [unfilled] week(s)
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
## 2023-03-16 NOTE — REASON FOR VISIT
[Patient preference] : as per patient preference [Continuing, patient not seen in-person within last 12 months (provide details below)] : Telehealth services are continuing, patient not seen in-person within last 12 months.  [Telehealth (audio & video) - Individual/Group] : This visit was provided via telehealth using real-time 2-way audio visual technology. [Medical Office: ([facility])___] : The provider was located at the medical office in [unfilled]. [Home] : The patient, [unfilled], was located at home, [unfilled], at the time of the visit. [Verbal consent obtained from patient/other participant(s)] : Verbal consent for telehealth/telephonic services obtained from patient/other participant(s) [Patient] : Patient [FreeTextEntry4] : 12:00PM [FreeTextEntry5] : 12:59PM [FreeTextEntry3] : Pt. has not been seen in-person as she has preferred telehealth. [FreeTextEntry1] : marital discord, low mood

## 2023-03-30 ENCOUNTER — APPOINTMENT (OUTPATIENT)
Dept: PSYCHIATRY | Facility: CLINIC | Age: 33
End: 2023-03-30

## 2023-04-03 ENCOUNTER — APPOINTMENT (OUTPATIENT)
Dept: PSYCHIATRY | Facility: CLINIC | Age: 33
End: 2023-04-03
Payer: COMMERCIAL

## 2023-04-03 PROCEDURE — 90837 PSYTX W PT 60 MINUTES: CPT | Mod: 95

## 2023-04-04 NOTE — REASON FOR VISIT
[Patient preference] : as per patient preference [Continuing, patient not seen in-person within last 12 months (provide details below)] : Telehealth services are continuing, patient not seen in-person within last 12 months.  [Telehealth (audio & video) - Individual/Group] : This visit was provided via telehealth using real-time 2-way audio visual technology. [Other Location: e.g. Home (Enter Location, City,State)___] : The provider was located at [unfilled]. [Home] : The patient, [unfilled], was located at home, [unfilled], at the time of the visit. [Verbal consent obtained from patient/other participant(s)] : Verbal consent for telehealth/telephonic services obtained from patient/other participant(s) [Patient] : Patient [FreeTextEntry4] : 2:02PM [FreeTextEntry5] : 2:59PM [FreeTextEntry1] : anxiety
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
## 2023-04-04 NOTE — PLAN
[Cognitive and/or Behavior Therapy] : Cognitive and/or Behavior Therapy  [FreeTextEntry2] : Pt. has identified the following therapy goals thus far:\par Problem 1: Difficulty managing anger and unwanted expressions of anger (e.g., hitting )\par Goal 1: Identify triggers for and alternative responses to anger\par Goal 2: Learn 1-2 skills for modifying anger-related cognitions\par Problem 2: Difficulty managing anxiety \par Goal 1: Reduce HITESH-7 score by 30%\par Goal 2: Learn to anchor in the present (i.e., mindful emotion awareness). \par Goal 3: Learn 2-3 skills for modifying unhelpful cognitions\par \par \par  [de-identified] : Pt. reported that she has overall felt less anxious and sad in the two weeks since her last appointment with writer. Pt. explained that several factors have contributed to her improve mood and anxiety. Specifically, pt. reported that she has been spending quality time with friends, went on a weekend trip with her , and has been practicing refocusing her attention from ruminative thoughts to what she is grateful for in her relationship with her . Writer and pt. agreed that pt. has benefited from anchoring herself in the present, and that continuing to do so would be important for decreasing her anger towards her . Writer and pt. also reflected on pt.'s overall progress in therapy. Pt. explained that she feels she has met her goal of identifying triggers for and alternative responses to anger (i.e., rather than hitting her  or making unhelpful comments towards him). Pt. indicated that she has also benefited from learning to identify and modify unhelpful cognitions, such as unrealistic self-blame about her 's affair. Pt. and writer agreed that continue review and reinforcement of the aforementioned skills on a weekly basis is still warranted at this time. For homework, pt. agreed to practice continuing anchoring in the present. Pt. also agreed to schedule an appointment with her prescriber, given that the pt. has questions about about the medication aspect of her tx plan. [Recommended Frequency of Visits: ____] : Recommended frequency of visits: [unfilled] [Return in ____ week(s)] : Return in [unfilled] week(s)

## 2023-04-13 ENCOUNTER — APPOINTMENT (OUTPATIENT)
Dept: PSYCHIATRY | Facility: CLINIC | Age: 33
End: 2023-04-13
Payer: COMMERCIAL

## 2023-04-13 PROCEDURE — 90837 PSYTX W PT 60 MINUTES: CPT | Mod: 95
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
## 2023-04-14 NOTE — REASON FOR VISIT
[Patient preference] : as per patient preference [Continuing, patient not seen in-person within last 12 months (provide details below)] : Telehealth services are continuing, patient not seen in-person within last 12 months.  [Telehealth (audio & video) - Individual/Group] : This visit was provided via telehealth using real-time 2-way audio visual technology. [Other Location: e.g. Home (Enter Location, City,State)___] : The provider was located at [unfilled]. [Home] : The patient, [unfilled], was located at home, [unfilled], at the time of the visit. [Verbal consent obtained from patient/other participant(s)] : Verbal consent for telehealth/telephonic services obtained from patient/other participant(s) [Patient] : Patient [FreeTextEntry4] : 11:00AM [FreeTextEntry5] : 11:55AM [FreeTextEntry1] : anxiety

## 2023-04-21 ENCOUNTER — APPOINTMENT (OUTPATIENT)
Dept: PSYCHIATRY | Facility: CLINIC | Age: 33
End: 2023-04-21
Payer: COMMERCIAL

## 2023-04-21 PROCEDURE — 90834 PSYTX W PT 45 MINUTES: CPT | Mod: 95

## 2023-04-24 NOTE — PLAN
[Cognitive and/or Behavior Therapy] : Cognitive and/or Behavior Therapy  [FreeTextEntry2] : Pt. has identified the following therapy goals thus far:\par Problem 1: Difficulty managing anger and unwanted expressions of anger (e.g., hitting )\par Goal 1: Identify triggers for and alternative responses to anger\par Goal 2: Learn 1-2 skills for modifying anger-related cognitions\par Problem 2: Difficulty managing anxiety \par Goal 1: Reduce HITESH-7 score by 30%\par Goal 2: Learn to anchor in the present (i.e., mindful emotion awareness). \par Goal 3: Learn 2-3 skills for modifying unhelpful cognitions\par \par \par  [de-identified] : Pt. indicated that she made several helpful changes in her sleep behaviors, which aligned with sleep hygiene psychoeducation that writer provided in a previous session. Pt. explained that the duration of time that it takes for her to fall asleep has improved. Writer and pt. discussed additional steps that pt. could take to improve sleep in the next week (e.g., pushing bedtime back further to increase sleep hunger and thus reduce night gaps). Pt. identified jealousy as an emotional that she has struggled with in the last week. Writer and pt. engaged in downward arrow to collaboratively identify cognitive distortions associated with pt.'s jealousy. Pt. was receptive to Socratic dialogue and for homework agreed to practice cognitive restructuring skills in the next week to identify and modify jealousy-related cognitive thought distortions. [Recommended Frequency of Visits: ____] : Recommended frequency of visits: [unfilled] [Return in ____ week(s)] : Return in [unfilled] week(s)

## 2023-04-24 NOTE — REASON FOR VISIT
[Patient preference] : as per patient preference [Continuing, patient not seen in-person within last 12 months (provide details below)] : Telehealth services are continuing, patient not seen in-person within last 12 months.  [Telehealth (audio & video) - Individual/Group] : This visit was provided via telehealth using real-time 2-way audio visual technology. [Other Location: e.g. Home (Enter Location, City,State)___] : The patient, [unfilled], was located at [unfilled] at the time of the visit. [Verbal consent obtained from patient/other participant(s)] : Verbal consent for telehealth/telephonic services obtained from patient/other participant(s) [Patient] : Patient [FreeTextEntry4] : 2:01PM [FreeTextEntry5] : 2:47PM [FreeTextEntry1] : marital discord; anxiety

## 2023-04-28 ENCOUNTER — APPOINTMENT (OUTPATIENT)
Dept: PSYCHIATRY | Facility: CLINIC | Age: 33
End: 2023-04-28
Payer: COMMERCIAL

## 2023-04-28 PROCEDURE — 90837 PSYTX W PT 60 MINUTES: CPT | Mod: 95

## 2023-05-01 NOTE — REASON FOR VISIT
[Patient preference] : as per patient preference [Continuing, patient not seen in-person within last 12 months (provide details below)] : Telehealth services are continuing, patient not seen in-person within last 12 months.  [Telehealth (audio & video) - Individual/Group] : This visit was provided via telehealth using real-time 2-way audio visual technology. [Other Location: e.g. Home (Enter Location, City,State)___] : The provider was located at [unfilled]. [Home] : The patient, [unfilled], was located at home, [unfilled], at the time of the visit. [Verbal consent obtained from patient/other participant(s)] : Verbal consent for telehealth/telephonic services obtained from patient/other participant(s) [FreeTextEntry4] : 3:05PM [FreeTextEntry5] : 3:58PM [Patient] : Patient [FreeTextEntry1] : marital discord; anxiety
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
## 2023-05-01 NOTE — PLAN
[FreeTextEntry2] : Pt. has identified the following therapy goals thus far:\par Problem 1: Difficulty managing anger and unwanted expressions of anger (e.g., hitting )\par Goal 1: Identify triggers for and alternative responses to anger\par Goal 2: Learn 1-2 skills for modifying anger-related cognitions\par Problem 2: Difficulty managing anxiety \par Goal 1: Reduce HITESH-7 score by 30%\par Goal 2: Learn to anchor in the present (i.e., mindful emotion awareness). \par Goal 3: Learn 2-3 skills for modifying unhelpful cognitions\par \par \par  [Cognitive and/or Behavior Therapy] : Cognitive and/or Behavior Therapy  [de-identified] : Pt. completed the homework of tracking thoughts associated with negative emotions such as a jealousy. Writer and pt. reviewed pt.'s thought log and engaged in Socratic dialogue to collaboratively challenge pt.'s jealousy-related cognitions. Pt. was receptive to Socratic dialogue. During this conversation, pt. explained that when she feel jealous about her 's affair, she feels rejected by him, and that instead of divulging this emotion to her  she instead makes hurtful comments towards him so that he also feels rejected. Writer and pt. discussed the negative impact of this behavior, and agreed that pt. would benefit from instead assertively communicating her emotions to her  when she feels rejected. Pt. agreed to do so for homework. Time was also spent in session collaboratively discussing problem-solving strategies and reviewing psychoeducation about child sleep hygiene, given that pt. has been distressed by her toddler's recent night-wakings.  [Recommended Frequency of Visits: ____] : Recommended frequency of visits: [unfilled] [Return in ____ week(s)] : Return in [unfilled] week(s)

## 2023-05-05 ENCOUNTER — APPOINTMENT (OUTPATIENT)
Dept: PSYCHIATRY | Facility: CLINIC | Age: 33
End: 2023-05-05
Payer: COMMERCIAL

## 2023-05-05 PROCEDURE — 90834 PSYTX W PT 45 MINUTES: CPT | Mod: 95

## 2023-05-08 NOTE — PLAN
[Cognitive and/or Behavior Therapy] : Cognitive and/or Behavior Therapy  [FreeTextEntry2] : Pt. has identified the following therapy goals thus far:\par Problem 1: Difficulty managing anger and unwanted expressions of anger (e.g., hitting )\par Goal 1: Identify triggers for and alternative responses to anger\par Goal 2: Learn 1-2 skills for modifying anger-related cognitions\par Problem 2: Difficulty managing anxiety \par Goal 1: Reduce HITESH-7 score by 30%\par Goal 2: Learn to anchor in the present (i.e., mindful emotion awareness). \par Goal 3: Learn 2-3 skills for modifying unhelpful cognitions\par \par \par  [de-identified] : Pt. explained that in the last week she began to practice communication of vulnerable emotions to her . For example, pt. reported that when she was out of town in the last week she missed her  and told him that she missed him. Pt. reported that this shift in her behavior was well received by her . In the last week, pt. also devised a written list of evidence for and against her belief that her  "valued" his affair partner above the pt. Writer and pt. reviewed this list and writer reinforced pt.'s use of cognitive restructuring tools. For homework, pt. agreed to identify an alternative explanation for her 's affair (i.e., counter to the idea that he valued his affair partner more than he valued the pt.).  [Recommended Frequency of Visits: ____] : Recommended frequency of visits: [unfilled] [Return in ____ week(s)] : Return in [unfilled] week(s)

## 2023-05-08 NOTE — REASON FOR VISIT
[Patient preference] : as per patient preference [Continuing, patient not seen in-person within last 12 months (provide details below)] : Telehealth services are continuing, patient not seen in-person within last 12 months.  [Telehealth (audio & video) - Individual/Group] : This visit was provided via telehealth using real-time 2-way audio visual technology. [Other Location: e.g. Home (Enter Location, City,State)___] : The provider was located at [unfilled]. [Home] : The patient, [unfilled], was located at home, [unfilled], at the time of the visit. [Verbal consent obtained from patient/other participant(s)] : Verbal consent for telehealth/telephonic services obtained from patient/other participant(s) [Patient] : Patient [FreeTextEntry4] : 11:00AM [FreeTextEntry5] : 11:51AM [FreeTextEntry1] : marital discord; anxiety
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
## 2023-05-10 ENCOUNTER — APPOINTMENT (OUTPATIENT)
Dept: PSYCHIATRY | Facility: CLINIC | Age: 33
End: 2023-05-10
Payer: COMMERCIAL

## 2023-05-10 PROCEDURE — 90834 PSYTX W PT 45 MINUTES: CPT | Mod: 95

## 2023-05-11 NOTE — REASON FOR VISIT
[Patient preference] : as per patient preference [Continuing, patient not seen in-person within last 12 months (provide details below)] : Telehealth services are continuing, patient not seen in-person within last 12 months.  [Telehealth (audio & video) - Individual/Group] : This visit was provided via telehealth using real-time 2-way audio visual technology. [Other Location: e.g. Home (Enter Location, City,State)___] : The provider was located at [unfilled]. [Home] : The patient, [unfilled], was located at home, [unfilled], at the time of the visit. [Verbal consent obtained from patient/other participant(s)] : Verbal consent for telehealth/telephonic services obtained from patient/other participant(s) [Patient] : Patient [FreeTextEntry4] : 11:30AM [FreeTextEntry5] : 12:15PM [FreeTextEntry1] : anxiety; worry

## 2023-05-11 NOTE — PLAN
[Cognitive and/or Behavior Therapy] : Cognitive and/or Behavior Therapy  [Recommended Frequency of Visits: ____] : Recommended frequency of visits: [unfilled] [FreeTextEntry2] : Pt. has identified the following therapy goals thus far:\par Problem 1: Difficulty managing anger and unwanted expressions of anger (e.g., hitting )\par Goal 1: Identify triggers for and alternative responses to anger\par Goal 2: Learn 1-2 skills for modifying anger-related cognitions\par Problem 2: Difficulty managing anxiety \par Goal 1: Reduce HITESH-7 score by 30%\par Goal 2: Learn to anchor in the present (i.e., mindful emotion awareness). \par Goal 3: Learn 2-3 skills for modifying unhelpful cognitions\par \par \par  [de-identified] : Pt. reported that she has been feeling exhausted in the last week because her daughter has been having difficulties with sleep. Pt. explained that because of this difficulty, she did not do the homework of identifying an alternative explanation for her 's affair (i.e., to counter the idea that he valued his affair partner more than he valued the pt.). Writer validated pt.s' emotional response to her daughter's sleep challenges. Writer and pt. reviewed potential strategies that pt. may pursue for helping her daughter with sleep. Writer and pt. also collaboratively identified an unhelpful belief that pt. held related to her daughter's sleep difficulties. Specifically, pt. was criticizing herself as a parent. Writer and pt. worked together to identify evidence for/against this belief and an alternative explanation. Pt. was receptive to Socratic dialogue.\par \par RTC in 2 weeks b/c pt. on vacation next week. [Return in ____ week(s)] : Return in [unfilled] week(s)
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
## 2023-05-19 NOTE — ASU DISCHARGE PLAN (ADULT/PEDIATRIC) - PATIENT BELONGINGS
Please advise.    Pt has a health screening for work that requires a lipid panel. Is it okay to add the lipid panel to her labs from yesterday?   Patient's belongings returned

## 2023-05-22 ENCOUNTER — APPOINTMENT (OUTPATIENT)
Dept: PSYCHIATRY | Facility: CLINIC | Age: 33
End: 2023-05-22
Payer: COMMERCIAL

## 2023-05-22 PROCEDURE — 90837 PSYTX W PT 60 MINUTES: CPT | Mod: 95
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
## 2023-05-23 NOTE — PLAN
[Cognitive and/or Behavior Therapy] : Cognitive and/or Behavior Therapy  [Recommended Frequency of Visits: ____] : Recommended frequency of visits: [unfilled] [Return in ____ week(s)] : Return in [unfilled] week(s) [FreeTextEntry2] : Pt. has identified the following therapy goals thus far:\par Problem 1: Difficulty managing anger and unwanted expressions of anger (e.g., hitting )\par Goal 1: Identify triggers for and alternative responses to anger\par Goal 2: Learn 1-2 skills for modifying anger-related cognitions\par Problem 2: Difficulty managing anxiety \par Goal 1: Reduce HITESH-7 score by 30%\par Goal 2: Learn to anchor in the present (i.e., mindful emotion awareness). \par Goal 3: Learn 2-3 skills for modifying unhelpful cognitions\par \par \par  [de-identified] : Pt. reported that she enjoyed her vacation with friends, and appreciated the break that this trip allowed her to take. Writer reinforced pt.'s engagement in self-care and recognition of the need to set aside time for herself. Pt. expressed feeling that her mood and anxiety have improved in recent weeks. Writer and pt. continued to engage in Socratic dialogue to collaboratively challenge unhelpful beliefs pt. was endorsing related to her qualities as a parent and her 's affair. Pt. was receptive to Socratic dialogue. Pt. also kept a written thought log of unhelpful cognitions and how she challenged them in the last week, which writer and pt. reviewed. For homework, pt. agreed to keep a written thought log again. \par \par Lastly, writer and pt. agreed that it may be beneficial for writer to communicate with pt.'s couples therapist at this time.

## 2023-05-23 NOTE — REASON FOR VISIT
[Patient preference] : as per patient preference [Continuing, patient not seen in-person within last 12 months (provide details below)] : Telehealth services are continuing, patient not seen in-person within last 12 months.  [Telehealth (audio & video) - Individual/Group] : This visit was provided via telehealth using real-time 2-way audio visual technology. [Other Location: e.g. Home (Enter Location, City,State)___] : The provider was located at [unfilled]. [Home] : The patient, [unfilled], was located at home, [unfilled], at the time of the visit. [Verbal consent obtained from patient/other participant(s)] : Verbal consent for telehealth/telephonic services obtained from patient/other participant(s) [Patient] : Patient [FreeTextEntry4] : 11:00AM [FreeTextEntry5] : 11:57AM [FreeTextEntry1] : anxiety; worry

## 2023-05-31 ENCOUNTER — APPOINTMENT (OUTPATIENT)
Dept: PSYCHIATRY | Facility: CLINIC | Age: 33
End: 2023-05-31
Payer: COMMERCIAL

## 2023-05-31 PROCEDURE — 90834 PSYTX W PT 45 MINUTES: CPT | Mod: 95

## 2023-05-31 NOTE — REASON FOR VISIT
[Patient preference] : as per patient preference [Continuing, patient not seen in-person within last 12 months (provide details below)] : Telehealth services are continuing, patient not seen in-person within last 12 months.  [Telehealth (audio & video) - Individual/Group] : This visit was provided via telehealth using real-time 2-way audio visual technology. [Medical Office: ([facility])___] : The provider was located at the medical office in [unfilled]. [Home] : The patient, [unfilled], was located at home, [unfilled], at the time of the visit. [Verbal consent obtained from patient/other participant(s)] : Verbal consent for telehealth/telephonic services obtained from patient/other participant(s) [Patient] : Patient [FreeTextEntry4] : 11:04AM [FreeTextEntry5] : 11:55AM [FreeTextEntry1] : Difficult managing anger and anxiety
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
## 2023-05-31 NOTE — PLAN
[Cognitive and/or Behavior Therapy] : Cognitive and/or Behavior Therapy  [FreeTextEntry2] : Pt. has identified the following therapy goals thus far:\par Problem 1: Difficulty managing anger and unwanted expressions of anger (e.g., hitting )\par Goal 1: Identify triggers for and alternative responses to anger\par Goal 2: Learn 1-2 skills for modifying anger-related cognitions\par Problem 2: Difficulty managing anxiety \par Goal 1: Reduce HITESH-7 score by 30%\par Goal 2: Learn to anchor in the present (i.e., mindful emotion awareness). \par Goal 3: Learn 2-3 skills for modifying unhelpful cognitions\par \par \par  [de-identified] : Pt. reported that this week marks one year since she learned of his 's affair, and that she was having increased difficulty managing anger and anxiety as a result. Writer validated pt.'s emotional response to this anniversary. Writer and pt. collaboratively identified unhelpful cognitions that may be contributing to pt's anger and anxiety. For example, pt. indicated that when she expresses vulnerable emotions to her , she has the expectation that he should respond in such a way that her feelings of sadness or shame are alleviated. Pt. explained that if her 's response does not alleviate these difficult emotions, she feels angry with him. Writer and pt. engaged in Socratic dialogue to challenge this belief, and reflected on how pt. can use the STOP skill to engage in an alternative response to this thought when it arises.\par \par Writer also communicate to pt. that she left a VM with pt.'s couples therapist to collaborate. [Recommended Frequency of Visits: ____] : Recommended frequency of visits: [unfilled] [Return in ____ week(s)] : Return in [unfilled] week(s)

## 2023-06-06 ENCOUNTER — APPOINTMENT (OUTPATIENT)
Dept: PSYCHIATRY | Facility: CLINIC | Age: 33
End: 2023-06-06
Payer: COMMERCIAL

## 2023-06-06 PROCEDURE — 90834 PSYTX W PT 45 MINUTES: CPT | Mod: 95

## 2023-06-06 NOTE — PLAN
[Cognitive and/or Behavior Therapy] : Cognitive and/or Behavior Therapy  [Motivational Interviewing] : Motivational Interviewing  [FreeTextEntry2] : Pt. has identified the following therapy goals thus far:\par Problem 1: Difficulty managing anger and unwanted expressions of anger (e.g., hitting )\par Goal 1: Identify triggers for and alternative responses to anger\par Goal 2: Learn 1-2 skills for modifying anger-related cognitions\par Problem 2: Difficulty managing anxiety \par Goal 1: Reduce HITESH-7 score by 30%\par Goal 2: Learn to anchor in the present (i.e., mindful emotion awareness). \par Goal 3: Learn 2-3 skills for modifying unhelpful cognitions\par \par \par  [de-identified] : Pt. explained that she has continued to experience increased anger and anxiety in the week since her last appt. Writer and pt. reflected on the role that the one year anniversary of having learned of her 's affair may be having on pt.'s ability to regulate anger and anxiety. Writer engaged pt. in Motivational Interviewing to reflect on how pt.'s values align with her actions. Writer and pt. then reviewed cognitive restructuring tools that pt. can use to reduce the impact of anger and anxiety on her daily life and behaviors. For example, pt. agreed that she would benefit from examining evidence for and against unhelpful cognitions related to her 's affair. Pt. also agreed to track triggers for rumination, given that she also voiced increased difficulty with interrupting rumination.\par \par RTC in 2 weeks b/c writer and pt. availability do not align next week. [Recommended Frequency of Visits: ____] : Recommended frequency of visits: [unfilled]

## 2023-06-06 NOTE — REASON FOR VISIT
[Patient preference] : as per patient preference [Continuing, patient not seen in-person within last 12 months (provide details below)] : Telehealth services are continuing, patient not seen in-person within last 12 months.  [Telehealth (audio & video) - Individual/Group] : This visit was provided via telehealth using real-time 2-way audio visual technology. [Other Location: e.g. Home (Enter Location, City,State)___] : The provider was located at [unfilled]. [Home] : The patient, [unfilled], was located at home, [unfilled], at the time of the visit. [Verbal consent obtained from patient/other participant(s)] : Verbal consent for telehealth/telephonic services obtained from patient/other participant(s) [Patient] : Patient [FreeTextEntry4] : 1:00PM [FreeTextEntry5] : 1:49PM [FreeTextEntry1] : Difficulty managing anger and anxiety
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
## 2023-06-21 ENCOUNTER — APPOINTMENT (OUTPATIENT)
Dept: PSYCHIATRY | Facility: CLINIC | Age: 33
End: 2023-06-21
Payer: COMMERCIAL

## 2023-06-21 PROCEDURE — 90837 PSYTX W PT 60 MINUTES: CPT | Mod: 95
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
## 2023-06-22 NOTE — PLAN
[FreeTextEntry2] : Pt. has identified the following therapy goals thus far:\par Problem 1: Difficulty managing anger and unwanted expressions of anger (e.g., hitting )\par Goal 1: Identify triggers for and alternative responses to anger\par Goal 2: Learn 1-2 skills for modifying anger-related cognitions\par Problem 2: Difficulty managing anxiety \par Goal 1: Reduce HITESH-7 score by 30%\par Goal 2: Learn to anchor in the present (i.e., mindful emotion awareness). \par Goal 3: Learn 2-3 skills for modifying unhelpful cognitions\par \par \par  [Cognitive and/or Behavior Therapy] : Cognitive and/or Behavior Therapy  [de-identified] : Pt. reported that took a positive pregnancy test and has since confirmed pregnancy w/gyn. Pt. explained that she felt positive emotions about her pregnancy, such as hope and excitement. Writer validated pt.'s emotional response to this news. Pt. reported that in the last two weeks she has made an ongoing effort to question unhelpful cognitions related to her 's affair. Pt. reported that thought restructuring was helpful for management of anger and anxiety. Pt. also tracked triggers for rumination. Writer and pt. discussed how pt. can respond to triggers for rumination, such as by anchoring in the present when she encounters triggers. Pt. was receptive to this feedback and agreed to practice for homework. [Recommended Frequency of Visits: ____] : Recommended frequency of visits: [unfilled] [Return in ____ week(s)] : Return in [unfilled] week(s)

## 2023-06-22 NOTE — REASON FOR VISIT
[Patient preference] : as per patient preference [Continuing, patient not seen in-person within last 12 months (provide details below)] : Telehealth services are continuing, patient not seen in-person within last 12 months.  [Telehealth (audio & video) - Individual/Group] : This visit was provided via telehealth using real-time 2-way audio visual technology. [Medical Office: ([facility])___] : The provider was located at the medical office in [unfilled]. [Home] : The patient, [unfilled], was located at home, [unfilled], at the time of the visit. [Verbal consent obtained from patient/other participant(s)] : Verbal consent for telehealth/telephonic services obtained from patient/other participant(s) [FreeTextEntry4] : 10:30AM [FreeTextEntry5] : 11:27AM [Patient] : Patient [FreeTextEntry1] : rumination; anxiety

## 2023-06-28 ENCOUNTER — APPOINTMENT (OUTPATIENT)
Dept: PSYCHIATRY | Facility: CLINIC | Age: 33
End: 2023-06-28

## 2023-06-28 ENCOUNTER — NON-APPOINTMENT (OUTPATIENT)
Age: 33
End: 2023-06-28

## 2023-07-03 ENCOUNTER — APPOINTMENT (OUTPATIENT)
Dept: PSYCHIATRY | Facility: CLINIC | Age: 33
End: 2023-07-03
Payer: COMMERCIAL

## 2023-07-03 PROCEDURE — 90834 PSYTX W PT 45 MINUTES: CPT | Mod: 95

## 2023-07-03 NOTE — REASON FOR VISIT
[Patient preference] : as per patient preference [Continuing, patient not seen in-person within last 12 months (provide details below)] : Telehealth services are continuing, patient not seen in-person within last 12 months.  [Telehealth (audio & video) - Individual/Group] : This visit was provided via telehealth using real-time 2-way audio visual technology. [Other Location: e.g. Home (Enter Location, City,State)___] : The provider was located at [unfilled]. [Home] : The patient, [unfilled], was located at home, [unfilled], at the time of the visit. [Verbal consent obtained from patient/other participant(s)] : Verbal consent for telehealth/telephonic services obtained from patient/other participant(s) [Patient] : Patient [FreeTextEntry4] : 2:02PM [FreeTextEntry5] : 2:51PM [FreeTextEntry1] : rumination; anxiety

## 2023-07-03 NOTE — PLAN
[Cognitive and/or Behavior Therapy] : Cognitive and/or Behavior Therapy  [FreeTextEntry2] : Pt. has identified the following therapy goals thus far:\par Problem 1: Difficulty managing anger and unwanted expressions of anger (e.g., hitting )\par Goal 1: Identify triggers for and alternative responses to anger\par Goal 2: Learn 1-2 skills for modifying anger-related cognitions\par Problem 2: Difficulty managing anxiety \par Goal 1: Reduce HITESH-7 score by 30%\par Goal 2: Learn to anchor in the present (i.e., mindful emotion awareness). \par Goal 3: Learn 2-3 skills for modifying unhelpful cognitions\par \par \par  [de-identified] : Pt. reported that she has had difficulty managing rumination and anger in the last two weeks. Writer validated pt.'s ongoing emotional response to stressors and reflected on cognitive and behavioral skills that pt. is using to address rumination and anger. Writer and pt. also reflected on triggers for rumination and anger. Specifically, writer and pt. agreed that feelings of anxiety and grief are often antecedents for her rumination and anger. Writer and pt. agreed that pt. may benefit from sitting with anxiety and grief before engaging in behaviors that align with anger and frustration. For example, pt. tends to send her  critical text messages when she feels angry. Rather than doing this, pt. agreed to sit with and tolerate her emotions of anxiety and hurt, and continue to make an effort to share those emotions with her  rather than just sharing her anger. \par \par RTC in 2 weeks b/c writer out of office next week. [Recommended Frequency of Visits: ____] : Recommended frequency of visits: [unfilled] [Return in ____ week(s)] : Return in [unfilled] week(s)

## 2023-07-13 ENCOUNTER — APPOINTMENT (OUTPATIENT)
Dept: PSYCHIATRY | Facility: CLINIC | Age: 33
End: 2023-07-13
Payer: COMMERCIAL

## 2023-07-13 PROCEDURE — 90837 PSYTX W PT 60 MINUTES: CPT | Mod: 95

## 2023-07-13 NOTE — REASON FOR VISIT
[Patient preference] : as per patient preference [Continuing, patient not seen in-person within last 12 months (provide details below)] : Telehealth services are continuing, patient not seen in-person within last 12 months.  [Telehealth (audio & video) - Individual/Group] : This visit was provided via telehealth using real-time 2-way audio visual technology. [Other Location: e.g. Home (Enter Location, City,State)___] : The provider was located at [unfilled]. [Home] : The patient, [unfilled], was located at home, [unfilled], at the time of the visit. [Verbal consent obtained from patient/other participant(s)] : Verbal consent for telehealth/telephonic services obtained from patient/other participant(s) [Patient] : Patient [FreeTextEntry4] : 12:02PM [FreeTextEntry5] : 12:55PM [FreeTextEntry1] : worry; rumination; anxiety
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
## 2023-07-13 NOTE — PLAN
[Cognitive and/or Behavior Therapy] : Cognitive and/or Behavior Therapy  [FreeTextEntry2] : Pt. has identified the following therapy goals thus far:\par Problem 1: Difficulty managing anger and unwanted expressions of anger (e.g., hitting )\par Goal 1: Identify triggers for and alternative responses to anger\par Goal 2: Learn 1-2 skills for modifying anger-related cognitions\par Problem 2: Difficulty managing anxiety \par Goal 1: Reduce HITESH-7 score by 30%\par Goal 2: Learn to anchor in the present (i.e., mindful emotion awareness). \par Goal 3: Learn 2-3 skills for modifying unhelpful cognitions\par \par \par  [Other: ____] : [unfilled] [de-identified] : Pt. indicated that she feels excited about her pregnancy, but continues to struggle with feelings of anger and anxiety. Pt. explained that she feels angry towards her 's affair partner, and worried that her relationship with her  will not recover over time. Writer and pt. discussed coping strategies that pt. has been implementing and her overall progress in therapy. Pt. explained that anchoring in the present and questioning unhelpful cognitions have been helpful skills, and that she generally feels increased control over how she displays her anger. Writer and pt. practiced decatastrophizing in session as a way to reduce negative emotion associated with pt.'s fear that her marriage will not recover. For homework, pt. agreed to identify additional therapy goals for moving forward, and to continue practice the abovementioned skills. [Recommended Frequency of Visits: ____] : Recommended frequency of visits: [unfilled] [Return in ____ week(s)] : Return in [unfilled] week(s)

## 2023-07-20 ENCOUNTER — NON-APPOINTMENT (OUTPATIENT)
Age: 33
End: 2023-07-20

## 2023-07-20 ENCOUNTER — APPOINTMENT (OUTPATIENT)
Dept: PSYCHIATRY | Facility: CLINIC | Age: 33
End: 2023-07-20
Payer: COMMERCIAL

## 2023-07-20 PROCEDURE — 90834 PSYTX W PT 45 MINUTES: CPT | Mod: 95

## 2023-07-20 NOTE — PLAN
[Cognitive and/or Behavior Therapy] : Cognitive and/or Behavior Therapy  [FreeTextEntry2] : Pt. has identified the following therapy goals thus far:\par Problem 1: Difficulty managing anger and unwanted expressions of anger (e.g., hitting )\par Goal 1: Identify triggers for and alternative responses to anger\par Goal 2: Learn 1-2 skills for modifying anger-related cognitions\par Problem 2: Difficulty managing anxiety \par Goal 1: Reduce HITESH-7 score by 30%\par Goal 2: Learn to anchor in the present (i.e., mindful emotion awareness). \par Goal 3: Learn 2-3 skills for modifying unhelpful cognitions\par \par \par  [de-identified] : Pt. reported that her NIPT came back positive for a trisomy. Pt. explained that she felt devastated about her NIPT results. Session primarily focused on validating pt.'s emotional response to her test results. Writer and pt. collaboratively reflected on the options that pt. was considering for her pregnancy. Writer and pt. also briefly engaged in Socratic dialogue to identify and challenge unhelpful cognitions. Pt. identified one pleasurable activity that she would engage in during the next week as she also proceeds with follow-up testing. [Recommended Frequency of Visits: ____] : Recommended frequency of visits: [unfilled] [Return in ____ week(s)] : Return in [unfilled] week(s)

## 2023-07-20 NOTE — REASON FOR VISIT
[Patient preference] : as per patient preference [Continuing, patient not seen in-person within last 12 months (provide details below)] : Telehealth services are continuing, patient not seen in-person within last 12 months.  [Telehealth (audio & video) - Individual/Group] : This visit was provided via telehealth using real-time 2-way audio visual technology. [Other Location: e.g. Home (Enter Location, City,State)___] : The provider was located at [unfilled]. [Home] : The patient, [unfilled], was located at home, [unfilled], at the time of the visit. [Verbal consent obtained from patient/other participant(s)] : Verbal consent for telehealth/telephonic services obtained from patient/other participant(s) [Patient] : Patient [FreeTextEntry4] : 10:09AM [FreeTextEntry5] : 10:55AM [FreeTextEntry1] : stress associated with pregnancy complication
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
## 2023-07-21 ENCOUNTER — ASOB RESULT (OUTPATIENT)
Age: 33
End: 2023-07-21

## 2023-07-21 ENCOUNTER — APPOINTMENT (OUTPATIENT)
Dept: MATERNAL FETAL MEDICINE | Facility: CLINIC | Age: 33
End: 2023-07-21
Payer: COMMERCIAL

## 2023-07-21 ENCOUNTER — APPOINTMENT (OUTPATIENT)
Dept: ANTEPARTUM | Facility: CLINIC | Age: 33
End: 2023-07-21
Payer: COMMERCIAL

## 2023-07-21 PROCEDURE — 76801 OB US < 14 WKS SINGLE FETUS: CPT

## 2023-07-21 PROCEDURE — 76813 OB US NUCHAL MEAS 1 GEST: CPT

## 2023-07-21 PROCEDURE — 99204 OFFICE O/P NEW MOD 45 MIN: CPT | Mod: 25

## 2023-07-21 PROCEDURE — ZZZZZ: CPT

## 2023-07-25 ENCOUNTER — NON-APPOINTMENT (OUTPATIENT)
Age: 33
End: 2023-07-25

## 2023-07-26 ENCOUNTER — OUTPATIENT (OUTPATIENT)
Dept: OUTPATIENT SERVICES | Facility: HOSPITAL | Age: 33
LOS: 1 days | End: 2023-07-26
Payer: COMMERCIAL

## 2023-07-26 ENCOUNTER — APPOINTMENT (OUTPATIENT)
Dept: PSYCHIATRY | Facility: CLINIC | Age: 33
End: 2023-07-26

## 2023-07-26 ENCOUNTER — APPOINTMENT (OUTPATIENT)
Dept: OBGYN | Facility: CLINIC | Age: 33
End: 2023-07-26
Payer: COMMERCIAL

## 2023-07-26 ENCOUNTER — NON-APPOINTMENT (OUTPATIENT)
Age: 33
End: 2023-07-26

## 2023-07-26 VITALS
DIASTOLIC BLOOD PRESSURE: 58 MMHG | RESPIRATION RATE: 20 BRPM | TEMPERATURE: 98 F | OXYGEN SATURATION: 100 % | WEIGHT: 123.9 LBS | HEIGHT: 63 IN | SYSTOLIC BLOOD PRESSURE: 102 MMHG | HEART RATE: 63 BPM

## 2023-07-26 VITALS
HEART RATE: 90 BPM | WEIGHT: 122 LBS | DIASTOLIC BLOOD PRESSURE: 56 MMHG | BODY MASS INDEX: 21.62 KG/M2 | HEIGHT: 63 IN | RESPIRATION RATE: 22 BRPM | SYSTOLIC BLOOD PRESSURE: 90 MMHG

## 2023-07-26 DIAGNOSIS — Q90.9 DOWN SYNDROME, UNSPECIFIED: ICD-10-CM

## 2023-07-26 DIAGNOSIS — Z98.890 OTHER SPECIFIED POSTPROCEDURAL STATES: Chronic | ICD-10-CM

## 2023-07-26 DIAGNOSIS — Z86.59 PERSONAL HISTORY OF OTHER MENTAL AND BEHAVIORAL DISORDERS: ICD-10-CM

## 2023-07-26 DIAGNOSIS — O35.8XX0 MATERNAL CARE FOR OTHER (SUSPECTED) FETAL ABNORMALITY AND DAMAGE, NOT APPLICABLE OR UNSPECIFIED: ICD-10-CM

## 2023-07-26 DIAGNOSIS — Z86.19 PERSONAL HISTORY OF OTHER INFECTIOUS AND PARASITIC DISEASES: ICD-10-CM

## 2023-07-26 DIAGNOSIS — Z90.79 ACQUIRED ABSENCE OF OTHER GENITAL ORGAN(S): Chronic | ICD-10-CM

## 2023-07-26 DIAGNOSIS — Z33.2 ENCOUNTER FOR ELECTIVE TERMINATION OF PREGNANCY: ICD-10-CM

## 2023-07-26 DIAGNOSIS — O35.9XX0 MATERNAL CARE FOR (SUSPECTED) FETAL ABNORMALITY AND DAMAGE, UNSPECIFIED, NOT APPLICABLE OR UNSPECIFIED: ICD-10-CM

## 2023-07-26 DIAGNOSIS — Z82.49 FAMILY HISTORY OF ISCHEMIC HEART DISEASE AND OTHER DISEASES OF THE CIRCULATORY SYSTEM: ICD-10-CM

## 2023-07-26 LAB
APTT BLD: 26.5 SEC — SIGNIFICANT CHANGE UP (ref 24.5–35.6)
BLD GP AB SCN SERPL QL: NEGATIVE — SIGNIFICANT CHANGE UP
HCT VFR BLD CALC: 36.5 % — SIGNIFICANT CHANGE UP (ref 34.5–45)
HGB BLD-MCNC: 12.3 G/DL — SIGNIFICANT CHANGE UP (ref 11.5–15.5)
INR BLD: 0.95 RATIO — SIGNIFICANT CHANGE UP (ref 0.85–1.18)
MCHC RBC-ENTMCNC: 32.6 PG — SIGNIFICANT CHANGE UP (ref 27–34)
MCHC RBC-ENTMCNC: 33.7 GM/DL — SIGNIFICANT CHANGE UP (ref 32–36)
MCV RBC AUTO: 96.8 FL — SIGNIFICANT CHANGE UP (ref 80–100)
NRBC # BLD: 0 /100 WBCS — SIGNIFICANT CHANGE UP (ref 0–0)
PLATELET # BLD AUTO: 250 K/UL — SIGNIFICANT CHANGE UP (ref 150–400)
PROTHROM AB SERPL-ACNC: 10 SEC — SIGNIFICANT CHANGE UP (ref 9.5–13)
RBC # BLD: 3.77 M/UL — LOW (ref 3.8–5.2)
RBC # FLD: 12.6 % — SIGNIFICANT CHANGE UP (ref 10.3–14.5)
RH IG SCN BLD-IMP: NEGATIVE — SIGNIFICANT CHANGE UP
WBC # BLD: 8.57 K/UL — SIGNIFICANT CHANGE UP (ref 3.8–10.5)
WBC # FLD AUTO: 8.57 K/UL — SIGNIFICANT CHANGE UP (ref 3.8–10.5)

## 2023-07-26 PROCEDURE — 76815 OB US LIMITED FETUS(S): CPT | Mod: 26

## 2023-07-26 PROCEDURE — 85027 COMPLETE CBC AUTOMATED: CPT

## 2023-07-26 PROCEDURE — 86901 BLOOD TYPING SEROLOGIC RH(D): CPT

## 2023-07-26 PROCEDURE — 86850 RBC ANTIBODY SCREEN: CPT

## 2023-07-26 PROCEDURE — 85730 THROMBOPLASTIN TIME PARTIAL: CPT

## 2023-07-26 PROCEDURE — 36415 COLL VENOUS BLD VENIPUNCTURE: CPT

## 2023-07-26 PROCEDURE — 85610 PROTHROMBIN TIME: CPT

## 2023-07-26 PROCEDURE — G0463: CPT

## 2023-07-26 PROCEDURE — 99204 OFFICE O/P NEW MOD 45 MIN: CPT | Mod: 25

## 2023-07-26 PROCEDURE — 86900 BLOOD TYPING SEROLOGIC ABO: CPT

## 2023-07-26 RX ORDER — DIPHENHYDRAMINE HCL 50 MG
1 CAPSULE ORAL
Refills: 0 | DISCHARGE

## 2023-07-26 RX ORDER — CLONAZEPAM 2 MG/1
TABLET ORAL
Refills: 0 | Status: ACTIVE | COMMUNITY

## 2023-07-26 RX ORDER — VALACYCLOVIR HYDROCHLORIDE 1 G/1
TABLET, FILM COATED ORAL
Refills: 0 | Status: ACTIVE | COMMUNITY

## 2023-07-26 RX ORDER — SODIUM CHLORIDE 9 MG/ML
1000 INJECTION, SOLUTION INTRAVENOUS
Refills: 0 | Status: DISCONTINUED | OUTPATIENT
Start: 2023-07-28 | End: 2023-08-11

## 2023-07-26 RX ORDER — IBUPROFEN 200 MG
3 TABLET ORAL
Qty: 0 | Refills: 0 | DISCHARGE

## 2023-07-26 RX ORDER — ACETAMINOPHEN 500 MG
3 TABLET ORAL
Qty: 0 | Refills: 0 | DISCHARGE

## 2023-07-26 NOTE — H&P PST ADULT - NSICDXPASTMEDICALHX_GEN_ALL_CORE_FT
PAST MEDICAL HISTORY:  Anxiety disorder     Ectopic pregnancy -s/p MTX with +Hcg and abdominal pain    Eczema     History of vertigo     Mild anemia     Missed

## 2023-07-26 NOTE — H&P PST ADULT - HISTORY OF PRESENT ILLNESS
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     Denies Recent travel, Exposure or Covid symptoms

## 2023-07-26 NOTE — H&P PST ADULT - ASSESSMENT
Airway:  normal    Mallampati-       Dental: Patient denies loose teeth    Activity :  dasi mets :     Airway:  normal    Mallampati-  2   Dental: Patient denies loose teeth    Activity : walk ,active , taking care of kids   dasi mets : 7 dasi mets

## 2023-07-26 NOTE — H&P PST ADULT - NSICDXPASTSURGICALHX_GEN_ALL_CORE_FT
PAST SURGICAL HISTORY:  H/O dilation and curettage     H/O unilateral salpingectomy     History of colonoscopy     S/P dilation and curettage

## 2023-07-26 NOTE — H&P PST ADULT - NSICDXPROCEDURE_GEN_ALL_CORE_FT
PROCEDURES:  D&C (dilatation and curettage, scraping of uterus) 26-Jul-2023 07:40:35  Yazmin Dickson

## 2023-07-26 NOTE — H&P PST ADULT - HEMATOLOGY/LYMPHATICS
Dr. Elam,   Please review CBC, ok for patient to restart her medication(Ibrance)?  Thank you.   negative

## 2023-07-27 ENCOUNTER — TRANSCRIPTION ENCOUNTER (OUTPATIENT)
Age: 33
End: 2023-07-27

## 2023-07-27 LAB
C TRACH RRNA SPEC QL NAA+PROBE: NOT DETECTED
N GONORRHOEA RRNA SPEC QL NAA+PROBE: NOT DETECTED
SOURCE AMPLIFICATION: NORMAL

## 2023-07-28 ENCOUNTER — RESULT REVIEW (OUTPATIENT)
Age: 33
End: 2023-07-28

## 2023-07-28 ENCOUNTER — TRANSCRIPTION ENCOUNTER (OUTPATIENT)
Age: 33
End: 2023-07-28

## 2023-07-28 ENCOUNTER — APPOINTMENT (OUTPATIENT)
Dept: OBGYN | Facility: CLINIC | Age: 33
End: 2023-07-28

## 2023-07-28 ENCOUNTER — OUTPATIENT (OUTPATIENT)
Dept: OUTPATIENT SERVICES | Facility: HOSPITAL | Age: 33
LOS: 1 days | End: 2023-07-28
Payer: COMMERCIAL

## 2023-07-28 VITALS
HEART RATE: 70 BPM | OXYGEN SATURATION: 99 % | RESPIRATION RATE: 17 BRPM | DIASTOLIC BLOOD PRESSURE: 65 MMHG | SYSTOLIC BLOOD PRESSURE: 110 MMHG

## 2023-07-28 VITALS
WEIGHT: 123.9 LBS | TEMPERATURE: 97 F | RESPIRATION RATE: 16 BRPM | SYSTOLIC BLOOD PRESSURE: 99 MMHG | HEART RATE: 68 BPM | DIASTOLIC BLOOD PRESSURE: 63 MMHG | HEIGHT: 63 IN | OXYGEN SATURATION: 100 %

## 2023-07-28 DIAGNOSIS — Q90.9 DOWN SYNDROME, UNSPECIFIED: ICD-10-CM

## 2023-07-28 DIAGNOSIS — Z98.890 OTHER SPECIFIED POSTPROCEDURAL STATES: Chronic | ICD-10-CM

## 2023-07-28 DIAGNOSIS — Z90.79 ACQUIRED ABSENCE OF OTHER GENITAL ORGAN(S): Chronic | ICD-10-CM

## 2023-07-28 PROCEDURE — 88304 TISSUE EXAM BY PATHOLOGIST: CPT | Mod: 26

## 2023-07-28 PROCEDURE — 88264 CHROMOSOME ANALYSIS 20-25: CPT

## 2023-07-28 PROCEDURE — 88304 TISSUE EXAM BY PATHOLOGIST: CPT

## 2023-07-28 PROCEDURE — 76998 US GUIDE INTRAOP: CPT | Mod: 26

## 2023-07-28 PROCEDURE — 88233 TISSUE CULTURE SKIN/BIOPSY: CPT

## 2023-07-28 PROCEDURE — 59840 INDUCED ABORTION D&C: CPT

## 2023-07-28 PROCEDURE — 88280 CHROMOSOME KARYOTYPE STUDY: CPT

## 2023-07-28 PROCEDURE — 59841 INDUCED ABORTION DILAT&EVAC: CPT

## 2023-07-28 RX ORDER — LIDOCAINE HCL 20 MG/ML
0.2 VIAL (ML) INJECTION ONCE
Refills: 0 | Status: COMPLETED | OUTPATIENT
Start: 2023-07-28 | End: 2023-07-28

## 2023-07-28 RX ORDER — IBUPROFEN 200 MG
1 TABLET ORAL
Qty: 0 | Refills: 0 | DISCHARGE

## 2023-07-28 RX ORDER — CEFAZOLIN SODIUM 1 G
2000 VIAL (EA) INJECTION ONCE
Refills: 0 | Status: COMPLETED | OUTPATIENT
Start: 2023-07-28 | End: 2023-07-28

## 2023-07-28 RX ORDER — ONDANSETRON 8 MG/1
4 TABLET, FILM COATED ORAL ONCE
Refills: 0 | Status: DISCONTINUED | OUTPATIENT
Start: 2023-07-28 | End: 2023-08-11

## 2023-07-28 RX ORDER — ACETAMINOPHEN 500 MG
3 TABLET ORAL
Qty: 0 | Refills: 0 | DISCHARGE

## 2023-07-28 RX ORDER — HYDROMORPHONE HYDROCHLORIDE 2 MG/ML
0.5 INJECTION INTRAMUSCULAR; INTRAVENOUS; SUBCUTANEOUS
Refills: 0 | Status: DISCONTINUED | OUTPATIENT
Start: 2023-07-28 | End: 2023-07-28

## 2023-07-28 RX ADMIN — SODIUM CHLORIDE 100 MILLILITER(S): 9 INJECTION, SOLUTION INTRAVENOUS at 10:40

## 2023-07-28 RX ADMIN — HYDROMORPHONE HYDROCHLORIDE 0.5 MILLIGRAM(S): 2 INJECTION INTRAMUSCULAR; INTRAVENOUS; SUBCUTANEOUS at 13:17

## 2023-07-28 NOTE — ASU PATIENT PROFILE, ADULT - FALL HARM RISK - UNIVERSAL INTERVENTIONS
Bed in lowest position, wheels locked, appropriate side rails in place/Call bell, personal items and telephone in reach/Instruct patient to call for assistance before getting out of bed or chair/Non-slip footwear when patient is out of bed/[place] to call system/Physically safe environment - no spills, clutter or unnecessary equipment/Purposeful Proactive Rounding/Room/bathroom lighting operational, light cord in reach

## 2023-07-28 NOTE — BRIEF OPERATIVE NOTE - NSICDXBRIEFPOSTOP_GEN_ALL_CORE_FT
POST-OP DIAGNOSIS:  Encounter for follow up of known or suspected fetal anomaly 28-Jul-2023 14:02:39  Radha Talamantes   POST-OP DIAGNOSIS:  Fetal cystic hygroma, single gestation 28-Jul-2023 14:48:41 1. IUP @ 13 1/7 weeks  2. cystic hygroma, NIPS positive trisomy 21 Laura Saul

## 2023-07-28 NOTE — ASU DISCHARGE PLAN (ADULT/PEDIATRIC) - ASU DC SPECIAL INSTRUCTIONSFT
Return to your regular way of eating.  Complete vaginal rest, no tampons, no douching, no tub bathing, no sexual activities for 2 weeks unless otherwise instructed by your doctor.  Call your doctor with any signs and symptoms of infection such as fever, chills, nausea or vomiting.  Call your doctor if you're unable to tolerate food or have difficulty urinating.  Call your doctor if you have pain that is not relieved by Tylenol/Motrin. Notify your doctor with any other concerns.  Follow up with Dr. Saul in 2 weeks.

## 2023-07-28 NOTE — BRIEF OPERATIVE NOTE - NSICDXBRIEFPROCEDURE_GEN_ALL_CORE_FT
PROCEDURES:  Dilation and curettage, uterus, using suction, with US guidance 28-Jul-2023 14:02:11  Radha Talamantes   PROCEDURES:  Dilation and curettage, uterus, using suction, with US guidance 28-Jul-2023 14:02:11  Radha Talamantes  Exam under anesthesia, pelvis 28-Jul-2023 14:37:26  Radha Talamantes   PROCEDURES:  Induction of  by dilation and curettage 2023 14:47:15 1. EUA  2. dilation and curettage under ultrasound guidance Laura Saul

## 2023-07-28 NOTE — ASU DISCHARGE PLAN (ADULT/PEDIATRIC) - CARE PROVIDER_API CALL
Laura Saul  Obstetrics and Gynecology  [address], [address]  [place] [address]  Phone: [phone]  Fax: [phone]  Established Patient  Follow Up Time: 2 weeks

## 2023-07-28 NOTE — BRIEF OPERATIVE NOTE - NSICDXBRIEFPREOP_GEN_ALL_CORE_FT
PRE-OP DIAGNOSIS:  Encounter for follow up of known or suspected fetal anomaly 28-Jul-2023 14:02:26  Radha Talamantes   PRE-OP DIAGNOSIS:  Fetal cystic hygroma, single gestation 28-Jul-2023 14:48:21 1. IUP @ 13 1/7 weeks  2. cystic hygroma, NIPS positive trisomy 21 Laura Saul

## 2023-07-28 NOTE — BRIEF OPERATIVE NOTE - OPERATION/FINDINGS
Anteverted, 12 wk sized uterus. 200mcg Cytotec given buccal before procedure. Pitocin added to IVF fluids and patient received Methergine 0.2mg IM and Carboprost 0.25mg IM during procedure. At the end of the procedure, all fetal parts and placenta were accounted for and appropriate for gestational age. A thin endometrial stripe was noted on ultrasound at the conclusion of the procedure. Anteverted uterus approximately 12 weeks size.  Fetus and placenta AGA, all fetal parts accounted for. Pitocin 30 units, Methergine 0.2mg IM and Carboprost 0.25mg IM during procedure.  BT: O+ Anteverted uterus approximately 12 weeks size.  Fetus and placenta AGA, all fetal parts accounted for. Pitocin 30 units, Methergine 0.2mg IM and Carboprost 0.25mg IM during procedure.  BT: O negative, s/p rhogam in recovery room

## 2023-07-28 NOTE — ASU DISCHARGE PLAN (ADULT/PEDIATRIC) - NS MD DC FALL RISK RISK
For information on Fall & Injury Prevention, visit: [email or web address] OR  [email or web address] OR  https://www.cdc.gov/steadi/patient.html

## 2023-07-28 NOTE — ASU DISCHARGE PLAN (ADULT/PEDIATRIC) - NURSING INSTRUCTIONS
OK to take Tylenol/Acetaminophen at/after 06:00PM__ for pain and every 6 hours after as needed. OK to take Motrin/Ibuprofen at/after 06:30PM____ for pain and every 6 hours after as needed.

## 2023-07-31 ENCOUNTER — APPOINTMENT (OUTPATIENT)
Dept: PSYCHIATRY | Facility: CLINIC | Age: 33
End: 2023-07-31
Payer: COMMERCIAL

## 2023-07-31 DIAGNOSIS — F43.21 ADJUSTMENT DISORDER WITH DEPRESSED MOOD: ICD-10-CM

## 2023-07-31 PROCEDURE — 90834 PSYTX W PT 45 MINUTES: CPT | Mod: 95

## 2023-08-01 PROBLEM — F43.21 GRIEF: Status: ACTIVE | Noted: 2023-08-01

## 2023-08-01 PROBLEM — F41.9 ANXIETY DISORDER, UNSPECIFIED: Chronic | Status: ACTIVE | Noted: 2023-07-26

## 2023-08-01 NOTE — REASON FOR VISIT
[Patient preference] : as per patient preference [Continuing, patient not seen in-person within last 12 months (provide details below)] : Telehealth services are continuing, patient not seen in-person within last 12 months.  [Telehealth (audio & video) - Individual/Group] : This visit was provided via telehealth using real-time 2-way audio visual technology. [Other Location: e.g. Home (Enter Location, City,State)___] : The provider was located at [unfilled]. [Home] : The patient, [unfilled], was located at home, [unfilled], at the time of the visit. [Verbal consent obtained from patient/other participant(s)] : Verbal consent for telehealth/telephonic services obtained from patient/other participant(s) [Patient] : Patient [FreeTextEntry4] : 2:00PM [FreeTextEntry5] : 2:51PM [FreeTextEntry1] : stress associated with marital discord and pregnancy complication
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
## 2023-08-04 ENCOUNTER — NON-APPOINTMENT (OUTPATIENT)
Age: 33
End: 2023-08-04

## 2023-08-04 NOTE — DISCUSSION/SUMMARY
[FreeTextEntry1] : Pt. completed LUCÍA so that writer could communicate with pt.'s couples therapist (see EMR). Writer spoke with pt.'s couples therapist, Linh Ledesma [facility], by phoen on this date about how pt.'s individual therapy and couples therapy goals and treatment plans align. Writer will discuss with pt. at her next individual therapy appt.

## 2023-08-09 ENCOUNTER — NON-APPOINTMENT (OUTPATIENT)
Age: 33
End: 2023-08-09

## 2023-08-09 ENCOUNTER — APPOINTMENT (OUTPATIENT)
Dept: PSYCHIATRY | Facility: CLINIC | Age: 33
End: 2023-08-09
Payer: COMMERCIAL

## 2023-08-09 ENCOUNTER — APPOINTMENT (OUTPATIENT)
Dept: OBGYN | Facility: CLINIC | Age: 33
End: 2023-08-09
Payer: COMMERCIAL

## 2023-08-09 DIAGNOSIS — Z09 ENCOUNTER FOR FOLLOW-UP EXAMINATION AFTER COMPLETED TREATMENT FOR CONDITIONS OTHER THAN MALIGNANT NEOPLASM: ICD-10-CM

## 2023-08-09 PROCEDURE — 99213 OFFICE O/P EST LOW 20 MIN: CPT | Mod: 95

## 2023-08-09 PROCEDURE — 90837 PSYTX W PT 60 MINUTES: CPT | Mod: 95

## 2023-08-10 LAB
CHROM ANALY OVERALL INTERP SPEC-IMP: SIGNIFICANT CHANGE UP
SURGICAL PATHOLOGY STUDY: SIGNIFICANT CHANGE UP

## 2023-08-10 NOTE — REASON FOR VISIT
[Patient preference] : as per patient preference [Continuing, patient not seen in-person within last 12 months (provide details below)] : Telehealth services are continuing, patient not seen in-person within last 12 months.  [Telehealth (audio & video) - Individual/Group] : This visit was provided via telehealth using real-time 2-way audio visual technology. [Medical Office: ([facility])___] : The provider was located at the medical office in [unfilled]. [Home] : The patient, [unfilled], was located at home, [unfilled], at the time of the visit. [Verbal consent obtained from patient/other participant(s)] : Verbal consent for telehealth/telephonic services obtained from patient/other participant(s) [Patient] : Patient [FreeTextEntry4] : 2:00PM [FreeTextEntry5] : 2:54PM [FreeTextEntry1] : stress associated with marital discord and pregnancy complication

## 2023-08-10 NOTE — PLAN
[Cognitive and/or Behavior Therapy] : Cognitive and/or Behavior Therapy  [FreeTextEntry2] : Pt. has identified the following therapy goals thus far: Problem 1: Difficulty managing anger and unwanted expressions of anger (e.g., hitting ) Goal 1: Identify triggers for and alternative responses to anger Goal 2: Learn 1-2 skills for modifying anger-related cognitions Problem 2: Difficulty managing anxiety  Goal 1: Reduce HITESH-7 score by 30% Goal 2: Learn to anchor in the present (i.e., mindful emotion awareness).  Goal 3: Learn 2-3 skills for modifying unhelpful cognitions     [de-identified] : Pt. explained that her recent pregnancy loss has caused her to reexamine the dynamic in her marriage. Pt. reported that she did not want to feel like her life was "on pause" because of her 's affair, and as a result has made a more consistent effort to share vulnerable emotions with her  when she experiences them, as opposed to only openly expressing anger and frustration. Writer engaged pt. in MI to enhance change-talk. Writer provided brief psychoeducation about opposite action as a tool that may be useful for helping the pt. with her goal of expressing a broader range of emotions towards her . Writer and pt. also reflected on pt.'s overall progress in therapy goals. [Recommended Frequency of Visits: ____] : Recommended frequency of visits: [unfilled] [Return in ____ week(s)] : Return in [unfilled] week(s)

## 2023-08-17 ENCOUNTER — APPOINTMENT (OUTPATIENT)
Dept: PSYCHIATRY | Facility: CLINIC | Age: 33
End: 2023-08-17
Payer: COMMERCIAL

## 2023-08-17 PROCEDURE — 90837 PSYTX W PT 60 MINUTES: CPT | Mod: 95

## 2023-08-17 NOTE — PLAN
[Cognitive and/or Behavior Therapy] : Cognitive and/or Behavior Therapy  [FreeTextEntry2] : Pt. has identified the following therapy goals thus far: Problem 1: Difficulty managing anger and unwanted expressions of anger (e.g., hitting ) Goal 1: Identify triggers for and alternative responses to anger Goal 2: Learn 1-2 skills for modifying anger-related cognitions Problem 2: Difficulty managing anxiety  Goal 1: Reduce HITESH-7 score by 30% Goal 2: Learn to anchor in the present (i.e., mindful emotion awareness).  Goal 3: Learn 2-3 skills for modifying unhelpful cognitions      [de-identified] : Pt. reported that her mood has continued to improve in the last week, though she has felt more anxious. Writer and pt. reflected on the factors that may be contributing to pt.'s anxiety, such as physiological/hormonal changes that pt. is experiencing post-abortal. Pt. explained that on one occasion in the last week, she communicated with her  in a way that she later felt regret over. Writer and pt. reflected on antecedents to pt.'s behavior, and collaboratively identified helpful coping strategies that pt. may engage in the next time she encounters such antecedents so as to increase likelihood that she communicates with her  in a way that aligns with her values. [Recommended Frequency of Visits: ____] : Recommended frequency of visits: [unfilled] [Return in ____ week(s)] : Return in [unfilled] week(s)

## 2023-08-17 NOTE — REASON FOR VISIT
[Patient preference] : as per patient preference [Continuing, patient not seen in-person within last 12 months (provide details below)] : Telehealth services are continuing, patient not seen in-person within last 12 months.  [Telehealth (audio & video) - Individual/Group] : This visit was provided via telehealth using real-time 2-way audio visual technology. [Other Location: e.g. Home (Enter Location, City,State)___] : The provider was located at [unfilled]. [Home] : The patient, [unfilled], was located at home, [unfilled], at the time of the visit. [Verbal consent obtained from patient/other participant(s)] : Verbal consent for telehealth/telephonic services obtained from patient/other participant(s) [Patient] : Patient [FreeTextEntry4] : 10:00AM [FreeTextEntry5] : 10:54AM [FreeTextEntry1] : stress associated with marital discord; rumination

## 2023-08-23 ENCOUNTER — APPOINTMENT (OUTPATIENT)
Dept: PSYCHIATRY | Facility: CLINIC | Age: 33
End: 2023-08-23
Payer: COMMERCIAL

## 2023-08-23 PROCEDURE — 90834 PSYTX W PT 45 MINUTES: CPT | Mod: 95

## 2023-08-23 NOTE — REASON FOR VISIT
[Patient preference] : as per patient preference [Continuing, patient not seen in-person within last 12 months (provide details below)] : Telehealth services are continuing, patient not seen in-person within last 12 months.  [Telehealth (audio & video) - Individual/Group] : This visit was provided via telehealth using real-time 2-way audio visual technology. [Other Location: e.g. Home (Enter Location, City,State)___] : The provider was located at [unfilled]. [Home] : The patient, [unfilled], was located at home, [unfilled], at the time of the visit. [Verbal consent obtained from patient/other participant(s)] : Verbal consent for telehealth/telephonic services obtained from patient/other participant(s) [Patient] : Patient [FreeTextEntry4] : 2:18PM [FreeTextEntry5] : 3:01PM [FreeTextEntry1] : marital discord; worry

## 2023-08-23 NOTE — PLAN
[Cognitive and/or Behavior Therapy] : Cognitive and/or Behavior Therapy  [FreeTextEntry2] : Pt. has identified the following therapy goals thus far: Problem 1: Difficulty managing anger and unwanted expressions of anger (e.g., hitting ) Goal 1: Identify triggers for and alternative responses to anger Goal 2: Learn 1-2 skills for modifying anger-related cognitions Problem 2: Difficulty managing anxiety  Goal 1: Reduce HITESH-7 score by 30% Goal 2: Learn to anchor in the present (i.e., mindful emotion awareness).  Goal 3: Learn 2-3 skills for modifying unhelpful cognitions      [de-identified] : Pt. reported that she has felt increasingly effective at identifying and challenging unhelpful thoughts, and as a result has experienced improvements in frustration and anxiety. Writer reinforced pt.'s use of CBT skills. Writer validated pt.'s emotional response to a series of difficult interactions that she had with her parents while on a family vacation. Lastly, pt. described her reaction to a parenting decision that pt.'s  made over a year ago. Writer consulted to ensure that further action (e.g., reporting) was not warranted. [Recommended Frequency of Visits: ____] : Recommended frequency of visits: [unfilled] [Return in ____ week(s)] : Return in [unfilled] week(s)

## 2023-08-29 ENCOUNTER — APPOINTMENT (OUTPATIENT)
Dept: PSYCHIATRY | Facility: CLINIC | Age: 33
End: 2023-08-29
Payer: COMMERCIAL

## 2023-08-29 PROCEDURE — 90834 PSYTX W PT 45 MINUTES: CPT | Mod: 95

## 2023-08-29 NOTE — REASON FOR VISIT
[Patient preference] : as per patient preference [Continuing, patient not seen in-person within last 12 months (provide details below)] : Telehealth services are continuing, patient not seen in-person within last 12 months.  [Telehealth (audio & video) - Individual/Group] : This visit was provided via telehealth using real-time 2-way audio visual technology. [Other Location: e.g. Home (Enter Location, City,State)___] : The provider was located at [unfilled]. [Home] : The patient, [unfilled], was located at home, [unfilled], at the time of the visit. [Verbal consent obtained from patient/other participant(s)] : Verbal consent for telehealth/telephonic services obtained from patient/other participant(s) [Patient] : Patient [FreeTextEntry4] : 12:00PM [FreeTextEntry5] : 12:53PM [FreeTextEntry1] : worry

## 2023-08-29 NOTE — PLAN
[Cognitive and/or Behavior Therapy] : Cognitive and/or Behavior Therapy  [Recommended Frequency of Visits: ____] : Recommended frequency of visits: [unfilled] [Return in ____ week(s)] : Return in [unfilled] week(s) [FreeTextEntry2] : Pt. has identified the following therapy goals thus far: Problem 1: Difficulty managing anger and unwanted expressions of anger (e.g., hitting ) Goal 1: Identify triggers for and alternative responses to anger Goal 2: Learn 1-2 skills for modifying anger-related cognitions Problem 2: Difficulty managing anxiety  Goal 1: Reduce HITESH-7 score by 30% Goal 2: Learn to anchor in the present (i.e., mindful emotion awareness).  Goal 3: Learn 2-3 skills for modifying unhelpful cognitions      [de-identified] : Session focused primarily on potential rupture of therapeutic alliance associated with discussion that writer and patient had at pt.'s prior appointment. Writer and pt. reflected on whether the rupture could be repaired, and what factors may be helpful in facilitating repair. Writer and pt. also collaboratively identified unhelpful cognitions that may have contributed to pt.'s anxiety and worry in the past week. Pt. was receptive to Socratic dialogue.  Pt. is going on vacation and so writer and pt. scheduled a tentative follow-up appointment for 9/20/23. Pt. agreed to reflect on whether she would like to continue therapy with writer, and writer and pt. will decide next steps in tx plan at f/u.

## 2023-09-07 ENCOUNTER — TRANSCRIPTION ENCOUNTER (OUTPATIENT)
Age: 33
End: 2023-09-07

## 2023-09-08 ENCOUNTER — APPOINTMENT (OUTPATIENT)
Dept: OBGYN | Facility: CLINIC | Age: 33
End: 2023-09-08
Payer: COMMERCIAL

## 2023-09-08 DIAGNOSIS — R14.0 ABDOMINAL DISTENSION (GASEOUS): ICD-10-CM

## 2023-09-08 PROCEDURE — 99213 OFFICE O/P EST LOW 20 MIN: CPT | Mod: 95

## 2023-09-08 NOTE — PLAN
[FreeTextEntry1] : 34 yo  s/p D+C presents for complaint of bloating - reviewed it can take up to 8 weeks to have menses post procedure - reviewed bloating likely secondary to ovulation/cycle return - recommend follow up with PCP due to patient concern

## 2023-09-08 NOTE — HISTORY OF PRESENT ILLNESS
[Home] : at home, [unfilled] , at the time of the visit. [Medical Office: ([facility])___] : at the medical office located in  [Verbal consent obtained from patient] : the patient, [unfilled] [FreeTextEntry1] : 34 yo  s/p D+C 23.  She reports bloating in legs and abdomen.  She has not had menses since procedure, she has not had sex since procedure.  She has not taken pregnancy test.

## 2023-09-20 ENCOUNTER — APPOINTMENT (OUTPATIENT)
Dept: PSYCHIATRY | Facility: CLINIC | Age: 33
End: 2023-09-20
Payer: COMMERCIAL

## 2023-09-20 PROCEDURE — 90837 PSYTX W PT 60 MINUTES: CPT | Mod: GT

## 2023-09-27 ENCOUNTER — APPOINTMENT (OUTPATIENT)
Dept: PSYCHIATRY | Facility: CLINIC | Age: 33
End: 2023-09-27
Payer: COMMERCIAL

## 2023-09-27 PROCEDURE — 90837 PSYTX W PT 60 MINUTES: CPT | Mod: GT

## 2023-10-05 ENCOUNTER — APPOINTMENT (OUTPATIENT)
Dept: PSYCHIATRY | Facility: CLINIC | Age: 33
End: 2023-10-05
Payer: COMMERCIAL

## 2023-10-05 PROCEDURE — 90837 PSYTX W PT 60 MINUTES: CPT | Mod: GT

## 2023-10-12 ENCOUNTER — NON-APPOINTMENT (OUTPATIENT)
Age: 33
End: 2023-10-12

## 2023-10-13 ENCOUNTER — APPOINTMENT (OUTPATIENT)
Dept: PSYCHIATRY | Facility: CLINIC | Age: 33
End: 2023-10-13
Payer: COMMERCIAL

## 2023-10-13 PROCEDURE — 90837 PSYTX W PT 60 MINUTES: CPT | Mod: GT

## 2023-10-20 ENCOUNTER — APPOINTMENT (OUTPATIENT)
Dept: PSYCHIATRY | Facility: CLINIC | Age: 33
End: 2023-10-20
Payer: COMMERCIAL

## 2023-10-20 PROCEDURE — 90837 PSYTX W PT 60 MINUTES: CPT | Mod: GT

## 2023-10-30 ENCOUNTER — APPOINTMENT (OUTPATIENT)
Dept: PSYCHIATRY | Facility: CLINIC | Age: 33
End: 2023-10-30
Payer: COMMERCIAL

## 2023-10-30 PROCEDURE — 90837 PSYTX W PT 60 MINUTES: CPT | Mod: GT

## 2023-10-31 NOTE — PRE-ANESTHESIA EVALUATION ADULT - NSANTHRISKNONERD_GEN_ALL_CORE
30-Oct-2023 30-Oct-2023 30-Oct-2023 30-Oct-2023 30-Oct-2023 25-Oct-2023 30-Oct-2023 25-Oct-2023 No risk alerts present 25-Oct-2023 25-Oct-2023 30-Oct-2023 30-Oct-2023 25-Oct-2023

## 2023-11-07 ENCOUNTER — APPOINTMENT (OUTPATIENT)
Dept: PSYCHIATRY | Facility: CLINIC | Age: 33
End: 2023-11-07

## 2023-11-07 ENCOUNTER — APPOINTMENT (OUTPATIENT)
Dept: PSYCHIATRY | Facility: CLINIC | Age: 33
End: 2023-11-07
Payer: COMMERCIAL

## 2023-11-07 PROCEDURE — 90837 PSYTX W PT 60 MINUTES: CPT | Mod: GT

## 2023-11-16 ENCOUNTER — APPOINTMENT (OUTPATIENT)
Dept: PSYCHIATRY | Facility: CLINIC | Age: 33
End: 2023-11-16
Payer: COMMERCIAL

## 2023-11-16 PROCEDURE — 90837 PSYTX W PT 60 MINUTES: CPT | Mod: GT

## 2023-11-27 ENCOUNTER — APPOINTMENT (OUTPATIENT)
Dept: PSYCHIATRY | Facility: CLINIC | Age: 33
End: 2023-11-27
Payer: COMMERCIAL

## 2023-11-27 PROCEDURE — 90837 PSYTX W PT 60 MINUTES: CPT | Mod: GT

## 2023-12-04 ENCOUNTER — APPOINTMENT (OUTPATIENT)
Dept: PSYCHIATRY | Facility: CLINIC | Age: 33
End: 2023-12-04
Payer: COMMERCIAL

## 2023-12-04 PROCEDURE — 90837 PSYTX W PT 60 MINUTES: CPT | Mod: GT

## 2023-12-05 ENCOUNTER — NON-APPOINTMENT (OUTPATIENT)
Age: 33
End: 2023-12-05

## 2023-12-12 ENCOUNTER — APPOINTMENT (OUTPATIENT)
Dept: PSYCHIATRY | Facility: CLINIC | Age: 33
End: 2023-12-12
Payer: COMMERCIAL

## 2023-12-12 PROCEDURE — 90837 PSYTX W PT 60 MINUTES: CPT | Mod: GT

## 2023-12-14 NOTE — REASON FOR VISIT
[Patient preference] : as per patient preference [Continuing, patient not seen in-person within last 12 months (provide details below)] : Telehealth services are continuing, patient not seen in-person within last 12 months.  [Telehealth (audio & video) - Individual/Group] : This visit was provided via telehealth using real-time 2-way audio visual technology. [Other Location: e.g. Home (Enter Location, City,State)___] : The provider was located at [unfilled]. [Home] : The patient, [unfilled], was located at home, [unfilled], at the time of the visit. [Verbal consent obtained from patient/other participant(s)] : Verbal consent for telehealth/telephonic services obtained from patient/other participant(s) [Patient] : Patient [FreeTextEntry4] : 2:00PM [FreeTextEntry5] : 2:55PM [FreeTextEntry1] : rumination; marital discord

## 2023-12-14 NOTE — PLAN
[Cognitive and/or Behavior Therapy] : Cognitive and/or Behavior Therapy  [Recommended Frequency of Visits: ____] : Recommended frequency of visits: [unfilled] [Return in ____ week(s)] : Return in [unfilled] week(s) [FreeTextEntry2] : Pt. has identified the following therapy goals thus far: Problem 1: Difficulty managing anger and unwanted expressions of anger (e.g., hitting ) Goal 1: Identify triggers for and alternative responses to anger Goal 2: Learn 1-2 skills for modifying anger-related cognitions Problem 2: Difficulty managing anxiety  Goal 1: Reduce HITESH-7 score by 30% Goal 2: Learn to anchor in the present (i.e., mindful emotion awareness).  Goal 3: Learn 2-3 skills for modifying unhelpful cognitions      [de-identified] : Pt. reported that she has continued to use cognitive restructuring strategies for management of worry and rumination. Pt. explained that cognitive tools, in addition to engagement in meaningful activities, has helped her to feel less anxious and frustrated. Writer reinforced pt.'s use of CBT skills and continued to engage in Socratic dialogue in session to challenge automatic negative beliefs that arose for the pt. in the last week. Writer and pt. will review pt.'s tx plan at f/u.

## 2023-12-21 ENCOUNTER — APPOINTMENT (OUTPATIENT)
Dept: PSYCHIATRY | Facility: CLINIC | Age: 33
End: 2023-12-21

## 2023-12-21 ENCOUNTER — NON-APPOINTMENT (OUTPATIENT)
Age: 33
End: 2023-12-21

## 2024-01-05 ENCOUNTER — APPOINTMENT (OUTPATIENT)
Dept: PSYCHIATRY | Facility: CLINIC | Age: 34
End: 2024-01-05
Payer: COMMERCIAL

## 2024-01-05 PROCEDURE — 90837 PSYTX W PT 60 MINUTES: CPT | Mod: 95
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
## 2024-01-10 NOTE — PLAN
[FreeTextEntry2] : Pt. has identified the following therapy goals thus far: Problem 1: Difficulty managing anger and unwanted expressions of anger (e.g., hitting ) Goal 1: Identify triggers for and alternative responses to anger Goal 2: Learn 1-2 skills for modifying anger-related cognitions Problem 2: Difficulty managing anxiety  Goal 1: Reduce HITESH-7 score by 30% Goal 2: Learn to anchor in the present (i.e., mindful emotion awareness).  Goal 3: Learn 2-3 skills for modifying unhelpful cognitions      [Cognitive and/or Behavior Therapy] : Cognitive and/or Behavior Therapy  [de-identified] : Pt. reported that she has experienced continued improvement in rumination and worry, particularly in relation to her marriage and 's prior affair. Writer and pt. reflected on pt.'s significant progress in management of anxiety and anger. Pt. explained that despite improvements in the aforementioned sx, she did notice increased feelings of burnout related to work. Pt. identified several specific steps she planned to take in the next week to help address burnout. Writer reinforced pt.'s plan. Writer and pt. also began to reflected on tx plan, and agreed to begin to consider transitioning to biweekly sessions given pt.'s progress. [Recommended Frequency of Visits: ____] : Recommended frequency of visits: [unfilled] [Return in ____ week(s)] : Return in [unfilled] week(s)

## 2024-01-10 NOTE — REASON FOR VISIT
[Patient preference] : as per patient preference [Continuing, patient not seen in-person within last 12 months (provide details below)] : Telehealth services are continuing, patient not seen in-person within last 12 months.  [Telehealth (audio & video) - Individual/Group] : This visit was provided via telehealth using real-time 2-way audio visual technology. [Other Location: e.g. Home (Enter Location, City,State)___] : The provider was located at [unfilled]. [Home] : The patient, [unfilled], was located at home, [unfilled], at the time of the visit. [Verbal consent obtained from patient/other participant(s)] : Verbal consent for telehealth/telephonic services obtained from patient/other participant(s) [FreeTextEntry4] : 2:00PM [FreeTextEntry5] : 2:53PM [Patient] : Patient [FreeTextEntry1] : worry; burnout

## 2024-01-19 ENCOUNTER — APPOINTMENT (OUTPATIENT)
Dept: PSYCHIATRY | Facility: CLINIC | Age: 34
End: 2024-01-19
Payer: COMMERCIAL

## 2024-01-19 PROCEDURE — 90837 PSYTX W PT 60 MINUTES: CPT | Mod: 95

## 2024-01-20 NOTE — PLAN
ICC VISIT NOTE         Subjective   Patient Disposition:   Logan Brown is a 21 month old male and is being seen in our office for   Chief Complaint   Patient presents with    Cough    Fever     Started Tuesday         HPI:  Cough  This is a new problem. Episode onset: 3-4 days. The problem has been unchanged. Associated symptoms include congestion, coughing and a fever. Pertinent negatives include no abdominal pain, rash or vomiting. Nothing aggravates the symptoms. He has tried nothing for the symptoms.          MEDICATIONS:  Current Outpatient Medications   Medication Sig    albuterol (ACCUNEB) 1.25 MG/3ML nebulizer solution Take 3 mLs by nebulization every 6 hours as needed for Wheezing.    hydroCORTisone (CORTIZONE) 2.5 % cream Apply 1 application topically 2 times daily as needed for Itching or Rash.    bacitracin 500 UNIT/GM ointment Apply topically 2 times daily. Apply on the lesions in the back    albuterol (VENTOLIN) (2.5 MG/3ML) 0.083% nebulizer solution Take 3 mLs by nebulization every 6 hours as needed for Shortness of Breath. Use 1.5 ml every 4-6 hrs as needed     No current facility-administered medications for this visit.       ALLERGIES:  ALLERGIES:  No Known Allergies    PAST MEDICAL HISTORY:  No past medical history on file.    PAST SURGICAL HISTORY:  No past surgical history on file.    FAMILY HISTORY:  No family history on file.    SOCIAL HISTORY:         Patient's medications, allergies, past medical, surgical, and social history  were reviewed and updated as appropriate.    REVIEW OF SYSTEMS  Review of Systems   Constitutional:  Positive for fever. Negative for activity change and appetite change.   HENT:  Positive for congestion. Negative for ear pain, mouth sores and voice change.    Eyes:  Negative for discharge, redness and itching.   Respiratory:  Positive for cough. Negative for wheezing and stridor.    Gastrointestinal:  Negative for abdominal pain, diarrhea and vomiting.    Genitourinary:  Negative for decreased urine volume.   Skin:  Negative for rash.   Hematological:  Negative for adenopathy.       Vitals:    01/20/24 0805 01/20/24 0818   Pulse:  (!) 147   Resp: 25    Temp: 98.6 °F (37 °C)    SpO2:  100%   Weight: 12 kg (26 lb 7.3 oz)           Objective     Physical Exam  Vitals and nursing note reviewed.   Constitutional:       General: He is active. He is not in acute distress.  HENT:      Head: Normocephalic and atraumatic.      Right Ear: Tympanic membrane, ear canal and external ear normal.      Left Ear: Tympanic membrane, ear canal and external ear normal.      Nose: Congestion present.      Mouth/Throat:      Lips: No lesions.      Mouth: Mucous membranes are moist. No oral lesions.      Dentition: No gum lesions.      Tongue: No lesions.      Pharynx: Oropharynx is clear.      Neck: Normal range of motion and neck supple. No rigidity.   Eyes:      General:         Right eye: No discharge.         Left eye: No discharge.      Extraocular Movements: Extraocular movements intact.      Conjunctiva/sclera: Conjunctivae normal.      Pupils: Pupils are equal, round, and reactive to light.   Cardiovascular:      Rate and Rhythm: Normal rate and regular rhythm.   Pulmonary:      Effort: Pulmonary effort is normal. No respiratory distress, nasal flaring or retractions.      Breath sounds: Normal breath sounds. No stridor. No wheezing.   Lymphadenopathy:      Cervical: No cervical adenopathy.   Skin:     General: Skin is warm and dry.      Capillary Refill: Capillary refill takes less than 2 seconds.      Findings: No rash.   Neurological:      Mental Status: He is alert and oriented for age.      Motor: No abnormal muscle tone.             ASSESSMENT:    Encounter Diagnoses   Name Primary?    Acute bronchiolitis due to unspecified organism Yes       POINT OF CARE TEST RESULTS  Results for orders placed or performed in visit on 01/20/24   POCT SARS-COV-2 Antigen/Flu Antigen Panel  via [facility]   Result Value Ref Range    POCT SARS-COV-2 ANTIGEN Not Detected Not Detected    Rapid Influenza A Ag Negative Negative, Indeterminate, Invalid Results - please disregard    Rapid Influenza B Ag Negative Negative, Indeterminate, Invalid Results - please disregard    TEST LOT NUMBER NA     TEST LOT EXPIRATION DATE NA    POCT RSV Rapid, In-Office   Result Value Ref Range    POCT RSV RAPID Negative Negative    Internal Procedural Controls Acceptable Yes Yes    TEST LOT NUMBER NA     TEST LOT EXPIRATION DATE NA        IMAGING RESULTS TODAY:    Initial review of the chest x-ray done here today is negative for findings to indicate infiltrate/pneumothorax or effusion.  Parents will be called with radiology report as needed.      Medical Decision Making / Plan  MDM     Orders Placed This Encounter    XR CHEST PA AND LATERAL 2 VIEWS     Scheduling Instructions:      Instructions for required prep will be provided at time of scheduling imaging exam.                  Order Specific Question:   Should patient return to clinician?     Answer:   Yes     Order Specific Question:   Reason for exam?     Answer:   Cough     Order Specific Question:   Is this order for pre-employment?     Answer:   No    POCT SARS-COV-2 Antigen/Flu Antigen Panel via [facility]     Order Specific Question:   Is the patient symptomatic per CDC?     Answer:   Yes     Order Specific Question:   Days since symptom onset?     Answer:   5 or less    POCT RSV Rapid, In-Office        Reassured symptoms likely viral.  For now recommend to continue supportive symptomatic care, encouraged hydration.  May come back or follow with primary care physician if not better in few days or sooner if worsening.  Patient's mom verbalized understanding, agrees with the plan of care.    Instructions provided as documented in the AVS.    Dot Kim MD  1/20/2024    The 21st Century Cures Act makes medical notes like these available to patients in the interest of  transparency. However, be advised this is a medical document. It is intended as peer to peer communication. It is written in medical language and may contain abbreviations, jargon, and other verbiage that could be misleading or confusing to lay persons. It may appear blunt or direct. Medical documents are intended to carry relevant information, facts as evident, and the clinical opinion of the medical provider.     This note was made using voice dictation and may include inadvertent errors due to the dictation software. Please contact for clarification regarding any noted discrepancies.      [FreeTextEntry2] : Pt. has identified the following therapy goals thus far: Problem 1: Difficulty managing anger and unwanted expressions of anger (e.g., hitting ) Goal 1: Identify triggers for and alternative responses to anger Goal 2: Learn 1-2 skills for modifying anger-related cognitions Problem 2: Difficulty managing anxiety  Goal 1: Reduce HITESH-7 score by 30% Goal 2: Learn to anchor in the present (i.e., mindful emotion awareness).  Goal 3: Learn 2-3 skills for modifying unhelpful cognitions    [Cognitive and/or Behavior Therapy] : Cognitive and/or Behavior Therapy  [de-identified] : Pt. explained that follow-up testing by M indicated that pt.'s pregnancy was not viable. Pt. explained that she underwent termination of the pregnancy. Pt. reported that she felt deeply disappointment and saddened. Writer and pt. reflected on and validated pt.'s emotional response to this loss. Writer and pt. also engaged in Socratic dialogue to challenge unhelpful cognitions associated with this loss. Pt. explained that going through this experience lead her to feel emotionally closer to her . Writer and pt. collaboratively identified several activities that pt. can engage in during the next week for self-care, as well as activities that may help pt. to feel closer to her , which pt. has indicated is important to her.  [Recommended Frequency of Visits: ____] : Recommended frequency of visits: [unfilled] [Return in ____ week(s)] : Return in [unfilled] week(s)

## 2024-01-22 NOTE — PLAN
[Cognitive and/or Behavior Therapy] : Cognitive and/or Behavior Therapy  [Recommended Frequency of Visits: ____] : Recommended frequency of visits: [unfilled] [Return in ____ week(s)] : Return in [unfilled] week(s) [FreeTextEntry2] : Pt. has identified the following therapy goals thus far: Problem 1: Difficulty managing anger and unwanted expressions of anger (e.g., hitting ) Goal 1: Identify triggers for and alternative responses to anger Goal 2: Learn 1-2 skills for modifying anger-related cognitions Problem 2: Difficulty managing anxiety  Goal 1: Reduce HITESH-7 score by 30% Goal 2: Learn to anchor in the present (i.e., mindful emotion awareness).  Goal 3: Learn 2-3 skills for modifying unhelpful cognitions      [de-identified] : Pt. reported that she had a difficult time in her relationship with her  while on a family vacation. Pt. described parenting challenges that she and her  encountered. Writer and pt. collaboratively identified unhelpful beliefs that were triggered by parenting-related disagreements with her . Pt. was receptive to Socratic dialogue. Writer and pt. also reviewed strategies pt. could use to manage frustration in her relationship. For example, pt. identified one antecedent for frustration. Pt. agreed to alert her  when she encounters this antecedent so that he could give her the necessary space.

## 2024-01-22 NOTE — REASON FOR VISIT
[Patient preference] : as per patient preference [Continuing, patient not seen in-person within last 12 months (provide details below)] : Telehealth services are continuing, patient not seen in-person within last 12 months.  [Telehealth (audio & video) - Individual/Group] : This visit was provided via telehealth using real-time 2-way audio visual technology. [Other Location: e.g. Home (Enter Location, City,State)___] : The provider was located at [unfilled]. [Home] : The patient, [unfilled], was located at home, [unfilled], at the time of the visit. [Verbal consent obtained from patient/other participant(s)] : Verbal consent for telehealth/telephonic services obtained from patient/other participant(s) [Patient] : Patient [FreeTextEntry4] : 12:00PM [FreeTextEntry5] : 12:53PM [FreeTextEntry1] : worry and stress associated with maritual discord

## 2024-01-25 ENCOUNTER — APPOINTMENT (OUTPATIENT)
Dept: PSYCHIATRY | Facility: CLINIC | Age: 34
End: 2024-01-25
Payer: COMMERCIAL

## 2024-01-25 PROCEDURE — 90837 PSYTX W PT 60 MINUTES: CPT | Mod: 95

## 2024-01-26 NOTE — REASON FOR VISIT
[Patient preference] : as per patient preference [Continuing, patient not seen in-person within last 12 months (provide details below)] : Telehealth services are continuing, patient not seen in-person within last 12 months.  [Other Location: e.g. Home (Enter Location, City,State)___] : The provider was located at [unfilled]. [Telehealth (audio & video) - Individual/Group] : This visit was provided via telehealth using real-time 2-way audio visual technology. [Verbal consent obtained from patient/other participant(s)] : Verbal consent for telehealth/telephonic services obtained from patient/other participant(s) [Home] : The patient, [unfilled], was located at home, [unfilled], at the time of the visit. [Patient] : Patient [FreeTextEntry4] : 2:00PM [FreeTextEntry5] : 2:53PM [FreeTextEntry1] : worry and stress associated with maritual discord

## 2024-01-26 NOTE — PLAN
[Cognitive and/or Behavior Therapy] : Cognitive and/or Behavior Therapy  [Recommended Frequency of Visits: ____] : Recommended frequency of visits: [unfilled] [Return in ____ week(s)] : Return in [unfilled] week(s) [Motivational Interviewing] : Motivational Interviewing  [de-identified] : Pt. reported that she has struggled with anger towards her  in the last week. Pt. noted that on several occasions she recognized antecedents for anger as they occurred, and was therefore able to take space as needed to avoid shouting or getting into an argument with her . Writer and pt. collaboratively identified factors, such as unhelpful beliefs or behaviors, that may have made it more challenging for the pt. to effectively regulate her anger on one occasion in the last week. Writer also engaged pt. in Motivational Interviewing to examine how her relationship with her  aligns with her goals and values.. [FreeTextEntry2] : Pt. has identified the following therapy goals thus far: Problem 1: Difficulty managing anger and unwanted expressions of anger (e.g., hitting ) Goal 1: Identify triggers for and alternative responses to anger Goal 2: Learn 1-2 skills for modifying anger-related cognitions Problem 2: Difficulty managing anxiety  Goal 1: Reduce HITESH-7 score by 30% Goal 2: Learn to anchor in the present (i.e., mindful emotion awareness).  Goal 3: Learn 2-3 skills for modifying unhelpful cognitions

## 2024-02-09 ENCOUNTER — APPOINTMENT (OUTPATIENT)
Dept: PSYCHIATRY | Facility: CLINIC | Age: 34
End: 2024-02-09
Payer: COMMERCIAL

## 2024-02-09 PROCEDURE — 90837 PSYTX W PT 60 MINUTES: CPT | Mod: 95

## 2024-02-12 NOTE — PLAN
[Cognitive and/or Behavior Therapy] : Cognitive and/or Behavior Therapy  [Recommended Frequency of Visits: ____] : Recommended frequency of visits: [unfilled] [Return in ____ week(s)] : Return in [unfilled] week(s) [FreeTextEntry2] : Pt. has identified the following therapy goals thus far: Problem 1: Difficulty managing anger and unwanted expressions of anger (e.g., hitting ) Goal 1: Identify triggers for and alternative responses to anger Goal 2: Learn 1-2 skills for modifying anger-related cognitions Problem 2: Difficulty managing anxiety  Goal 1: Reduce HITESH-7 score by 30% Goal 2: Learn to anchor in the present (i.e., mindful emotion awareness).  Goal 3: Learn 2-3 skills for modifying unhelpful cognitions      [de-identified] : Pt. reported that she has continued to struggle with anger towards her  in the last several weeks. Pt. explained that she has also experienced challenging changes to her menstrual cycle. For homework pt. agreed to make a f/u appt. with gynecology. Pt. also agreed to make a f/u appt. with her couples therapist, given that she and her  have not attended couples therapy for several months. Writer engaged pt. in Socratic dialogue to identify and challenge unhelpful anger-related beliefs that she was experiencing.

## 2024-02-12 NOTE — REASON FOR VISIT
[Patient preference] : as per patient preference [Continuing, patient not seen in-person within last 12 months (provide details below)] : Telehealth services are continuing, patient not seen in-person within last 12 months.  [Telehealth (audio & video) - Individual/Group] : This visit was provided via telehealth using real-time 2-way audio visual technology. [Other Location: e.g. Home (Enter Location, City,State)___] : The provider was located at [unfilled]. [Home] : The patient, [unfilled], was located at home, [unfilled], at the time of the visit. [Verbal consent obtained from patient/other participant(s)] : Verbal consent for telehealth/telephonic services obtained from patient/other participant(s) [Patient] : Patient [FreeTextEntry4] : 3:00PM [FreeTextEntry5] : 3:53PM [FreeTextEntry1] : worry and stress associated with marital discord

## 2024-02-16 ENCOUNTER — APPOINTMENT (OUTPATIENT)
Dept: PSYCHIATRY | Facility: CLINIC | Age: 34
End: 2024-02-16
Payer: COMMERCIAL

## 2024-02-16 PROCEDURE — 90837 PSYTX W PT 60 MINUTES: CPT | Mod: 95

## 2024-02-21 NOTE — PLAN
[FreeTextEntry2] : Pt. has identified the following therapy goals thus far: Problem 1: Difficulty managing anger and unwanted expressions of anger (e.g., hitting ) Goal 1: Identify triggers for and alternative responses to anger Goal 2: Learn 1-2 skills for modifying anger-related cognitions Problem 2: Difficulty managing anxiety  Goal 1: Reduce HITESH-7 score by 30% Goal 2: Learn to anchor in the present (i.e., mindful emotion awareness).  Goal 3: Learn 2-3 skills for modifying unhelpful cognitions      [Cognitive and/or Behavior Therapy] : Cognitive and/or Behavior Therapy  [de-identified] : Pt. reported that she has continued to struggle with anger towards her  in the week. Pt. noted that she had not made an appointment with her couples therapist yet, but that she intended to in the next week. Writer and pt. continued to collaboratively identify and challenge unhelpful thoughts that pt. was experiencing about her relationship with her . Pt. was receptive to Socratic dialogue.   [Recommended Frequency of Visits: ____] : Recommended frequency of visits: [unfilled] [Return in ____ week(s)] : Return in [unfilled] week(s)

## 2024-02-21 NOTE — END OF VISIT
[Duration of Psychotherapy Visit (minutes spent in synchronous communication): ____] : Duration of Psychotherapy Visit (minutes spent in synchronous communication): [unfilled] [Individual Psychotherapy for 53+ minutes] : Individual Psychotherapy for 53+ minutes  [Licensed Clinician] : Licensed Clinician Continue Regimen: Triamcinolone twice a day as needed for up to a week Detail Level: Zone Initiate Treatment: Tacrolimus twice a day Render In Strict Bullet Format?: No

## 2024-02-21 NOTE — REASON FOR VISIT
[Patient preference] : as per patient preference [Continuing, patient not seen in-person within last 12 months (provide details below)] : Telehealth services are continuing, patient not seen in-person within last 12 months.  [Telehealth (audio & video) - Individual/Group] : This visit was provided via telehealth using real-time 2-way audio visual technology. [Other Location: e.g. Home (Enter Location, City,State)___] : The provider was located at [unfilled]. [Home] : The patient, [unfilled], was located at home, [unfilled], at the time of the visit. [Verbal consent obtained from patient/other participant(s)] : Verbal consent for telehealth/telephonic services obtained from patient/other participant(s) [FreeTextEntry4] : 3:00PM [FreeTextEntry5] : 3:55PM [Patient] : Patient [FreeTextEntry1] : worry and stress associated with marital discord

## 2024-03-01 ENCOUNTER — APPOINTMENT (OUTPATIENT)
Dept: PSYCHIATRY | Facility: CLINIC | Age: 34
End: 2024-03-01
Payer: COMMERCIAL

## 2024-03-01 PROCEDURE — 90832 PSYTX W PT 30 MINUTES: CPT | Mod: 95

## 2024-03-01 NOTE — PHYSICAL EXAM
[Average] : average [Anxious] : anxious [Full] : full [Cooperative] : cooperative [Clear] : clear [Linear/Goal Directed] : linear/goal directed [WNL] : within normal limits

## 2024-03-01 NOTE — REASON FOR VISIT
[Continuing, patient not seen in-person within last 12 months (provide details below)] : Telehealth services are continuing, patient not seen in-person within last 12 months.  [Patient preference] : as per patient preference [Telehealth (audio & video) - Individual/Group] : This visit was provided via telehealth using real-time 2-way audio visual technology. [Other Location: e.g. Home (Enter Location, City,State)___] : The provider was located at [unfilled]. [Verbal consent obtained from patient/other participant(s)] : Verbal consent for telehealth/telephonic services obtained from patient/other participant(s) [Home] : The patient, [unfilled], was located at home, [unfilled], at the time of the visit. [Patient] : Patient [FreeTextEntry5] : 1:59PM [FreeTextEntry4] : 1:25PM [FreeTextEntry1] : worry and stress associated with marital discord
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
## 2024-03-01 NOTE — END OF VISIT
[Duration of Psychotherapy Visit (minutes spent in synchronous communication): ____] : Duration of Psychotherapy Visit (minutes spent in synchronous communication): [unfilled] [Licensed Clinician] : Licensed Clinician [Individual Psychotherapy for 16-37 minutes] : Individual Psychotherapy for 16-37 minutes

## 2024-03-01 NOTE — PLAN
[Cognitive and/or Behavior Therapy] : Cognitive and/or Behavior Therapy  [Recommended Frequency of Visits: ____] : Recommended frequency of visits: [unfilled] [Return in ____ week(s)] : Return in [unfilled] week(s) [FreeTextEntry2] : Pt. has identified the following therapy goals thus far: Problem 1: Difficulty managing anger and unwanted expressions of anger (e.g., hitting ) Goal 1: Identify triggers for and alternative responses to anger Goal 2: Learn 1-2 skills for modifying anger-related cognitions Problem 2: Difficulty managing anxiety  Goal 1: Reduce HITESH-7 score by 30% Goal 2: Learn to anchor in the present (i.e., mindful emotion awareness).  Goal 3: Learn 2-3 skills for modifying unhelpful cognitions      [de-identified] : Pt. arrived late to session because her work-related event ran later than expected. Pt. reported that she and her  resumed couples therapy, which writer reinforced. Pt. identified a strong desire to forgive her . Writer and pt. reviewed cognitive restructuring tools that may help pt. continue to address unhelpful thoughts that contribute to anxiety and frustration about her marriage.

## 2024-03-05 NOTE — PATIENT PROFILE ADULT - DO YOU FEEL UNSAFE AT SCHOOL?
[facility] - Emergency Dept  [facility]  History & Physical    Patient Name: Cali Mabry  MRN: [id number]  Patient Class: IP- Inpatient  Admission Date: 3/4/2024  Attending Physician: Doroteo Vyas MD   Primary Care Provider: Lawanda Flores NP         Patient information was obtained from parent, past medical records, and ER records.     Subjective:     Principal Problem:Acute encephalopathy    Chief Complaint:   Chief Complaint   Patient presents with    Weakness     + pt. Presents from EMS for weakness unknown for how long it has been happening, patient AAOx1, also experiencing some expressive aphasia, hx of stroke (right sided deficits noted)        HPI: 56 M with DM1, extensive IVDU, HCV Ab+, HTN, previous CVA with R sided deficits, HFpEF, CAD, chronic pancreatitis, mixed mood / psychiatric disorder presenting to [facility] ED with chief complaint of weakness of unspecified duration and onset. Admits to using shooting methamphetamines. Rest of history from patient is non-contributory due to uncooperation and alternating agitation and somnolence. HPI is written with review of previous medical records, ED records, discussed with patient's mother via telephone, who was able to provide limited insight into the patient's acute medical condition. Patient's mother states that patient's PO intake has decreased but he would not cooperate with her nor would he tell her what was bothering him. Patient reported to want to stay in bed for the last 2 days.     Of note, patient was recently discharged from [facility] following treatment of DKA and UTI (largely pan-sensitive Proteus mirabilis). In ED, patient was extremely agitated, given multiple doses of Ativan due to agitation, aggressive/abusive statements towards nursing and medical staff. Tachycardic to 110s, hypertensive, febrile to 101.5. Given broad spectrum Abx (Vanc/Zosyn). PEC'ed by ED due to extreme agitation.    Past Medical History:   Diagnosis Date     "Acute on chronic pancreatitis 04/03/2016    Addiction to drug     CHF (congestive heart failure)     DKA (diabetic ketoacidoses) 02/2020    DM (diabetes mellitus), type 1     HCV (hepatitis C virus)     high VL     HTN (hypertension)     Hx of psychiatric care     Hypomagnesemia 05/25/2020    IVDU (intravenous drug user)     Heroin    Pancreatitis     Psychiatric problem     Stroke     Substance abuse        Past Surgical History:   Procedure Laterality Date    CARDIAC SURGERY  1999    stent placed. ([facility])    ESOPHAGOGASTRODUODENOSCOPY N/A 3/5/2020    Gastritis.  No H pylori.  Completed PPI for 1 month.  Procedure: EGD (ESOPHAGOGASTRODUODENOSCOPY);  Surgeon: Brinda Rene MD;  Location: [facility] ENDO;  Service: Endoscopy;  Laterality: N/A;  [id number]; [phone]    LEFT HEART CATHETERIZATION Left 9/28/2020    Procedure: Left heart cath;  Surgeon: Fito Rangel MD;  Location: [facility] CATH LAB;  Service: Cardiology;  Laterality: Left;    SHOULDER SURGERY  2013    right shoulder, spur removal.       Review of patient's allergies indicates:  No Known Allergies    No current facility-administered medications on file prior to encounter.     Current Outpatient Medications on File Prior to Encounter   Medication Sig    albuterol (PROVENTIL/VENTOLIN HFA) 90 mcg/actuation inhaler Inhale 1-2 puffs into the lungs every 6 (six) hours as needed for Wheezing. Rescue    aspirin 81 MG Chew Take 1 tablet (81 mg total) by mouth once daily.    atorvastatin (LIPITOR) 20 MG tablet Take 1 tablet (20 mg total) by mouth once daily.    BD GERTRUDIS 2ND GEN PEN NEEDLE 32 gauge x 5/32" Ndle Inject into the skin 4 (four) times daily.    carBAMazepine (TEGRETOL XR) 200 MG 12 hr tablet Take 1 tablet (200 mg total) by mouth once daily. (Patient taking differently: Take 400 mg by mouth once daily.)    cetirizine (ZYRTEC) 10 MG tablet Take 1 tablet (10 mg total) by mouth once daily.    clopidogreL (PLAVIX) 75 mg tablet Take 1 tablet (75 mg total) " by mouth once daily.    folic acid (FOLVITE) 1 MG tablet Take 1 tablet (1 mg total) by mouth once daily.    FREESTYLE SEBASTIAN 2 SENSOR Kit USE FOUR TIMES PER WEEK AS DIRECTED    insulin detemir U-100, Levemir, 100 unit/mL (3 mL) SubQ InPn pen Inject 10 Units into the skin every evening.    insulin detemir U-100, Levemir, 100 unit/mL (3 mL) SubQ InPn pen Inject 17 Units into the skin once daily.    insulin lispro 100 unit/mL pen Inject into the skin.    levocetirizine (XYZAL) 5 MG tablet Take 5 mg by mouth every evening.    loratadine (CLARITIN) 10 mg tablet Take 10 mg by mouth.    losartan (COZAAR) 50 MG tablet Take 1 tablet (50 mg total) by mouth once daily.    melatonin (MELATIN) 3 mg tablet Take 2 tablets (6 mg total) by mouth nightly.    metoprolol succinate (TOPROL-XL) 25 MG 24 hr tablet Take 1 tablet (25 mg total) by mouth once daily.    multivitamin Tab Take 1 tablet by mouth once daily.    naloxone (NARCAN) 4 mg/actuation Spry 4mg by nasal route as needed for opioid overdose; may repeat every 2-3 minutes in alternating nostrils until medical help arrives. Call 911    ONETOUCH DELICA PLUS LANCET 33 gauge Misc Apply topically 4 (four) times daily.    ONETOUCH ULTRA TEST Strp USE TO TEST BLOOD SUGAR FOUR TIMES DAILY    ONETOUCH ULTRA2 METER Misc 4 (four) times daily.    risperiDONE (RISPERDAL M-TABS) 1 MG TbDL Take 1 tablet (1 mg total) by mouth 2 (two) times daily.    thiamine 100 MG tablet Take 1 tablet (100 mg total) by mouth once daily.    venlafaxine (EFFEXOR-XR) 75 MG 24 hr capsule Take 1 capsule (75 mg total) by mouth once daily.    [DISCONTINUED] insulin aspart U-100 (NOVOLOG) 100 unit/mL injection Inject 1-10 Units into the skin 3 (three) times daily with meals.     Family History       Problem Relation (Age of Onset)    Asthma Mother          Tobacco Use    Smoking status: Former     Current packs/day: 0.00     Average packs/day: 1 pack/day for 39.2 years (39.2 ttl pk-yrs)     Types: Cigarettes      "Start date: 4/3/1981     Quit date: 6/1/2020     Years since quitting: 3.7    Smokeless tobacco: Never    Tobacco comments:     states approx 1 per day.   Substance and Sexual Activity    Alcohol use: Not Currently     Alcohol/week: 0.0 standard drinks of alcohol    Drug use: Yes     Types: Heroin, IV, Marijuana, Amphetamines     Comment: chronic methamphetamine user    Sexual activity: Yes     Partners: Female     Review of Systems   Unable to perform ROS: Mental status change     Objective:     Vital Signs (Most Recent):  Temp: 98.2 °F (36.8 °C) (03/04/24 1745)  Pulse: 109 (03/04/24 1221)  Resp: 20 (03/04/24 1121)  BP: (!) 190/105 (03/04/24 1221)  SpO2: 97 % (03/04/24 1221) Vital Signs (24h Range):  Temp:  [98.2 °F (36.8 °C)-101.5 °F (38.6 °C)] 98.2 °F (36.8 °C)  Pulse:  [109-113] 109  Resp:  [20] 20  SpO2:  [97 %] 97 %  BP: (164-190)/() 190/105     Weight: 63.5 kg (140 lb)  Body mass index is 18.99 kg/m².     Limited Physical Exam conducted d/t patient's uncooperation and verbal aggression  Physical Exam  Constitutional:       Comments: Disheveled    HENT:      Head: Normocephalic.   Eyes:      General: No scleral icterus.     Extraocular Movements: Extraocular movements intact.   Cardiovascular:      Rate and Rhythm: Regular rhythm. Tachycardia present.   Pulmonary:      Effort: No respiratory distress.      Breath sounds: Normal breath sounds. No wheezing or rales.   Abdominal:      General: There is no distension.      Palpations: Abdomen is soft.   Musculoskeletal:      Cervical back: Normal range of motion.   Skin:     General: Skin is warm and dry.      Findings: No rash.   Neurological:      Mental Status: He is alert.   Psychiatric:      Comments: Oriented to person and place. Uncooperative with questioning and physical exam. Verbally abusive.              Significant Labs: All pertinent labs within the past 24 hours have been reviewed.  Blood Culture: No results for input(s): "LABBLOO" in the last " "48 hours.  CBC:   Recent Labs   Lab 03/04/24  1254   WBC 7.24   HGB 12.5*   HCT 39.6*        CMP:   Recent Labs   Lab 03/04/24  1254      K 4.1      CO2 25   *   BUN 17   CREATININE 1.1   CALCIUM 9.4   PROT 7.8   ALBUMIN 3.4*   BILITOT 0.4   ALKPHOS 114   AST 13   ALT 17   ANIONGAP 10     Lactic Acid: No results for input(s): "LACTATE" in the last 48 hours.  Urine Studies:   Recent Labs   Lab 03/04/24  1500   COLORU Colorless*   APPEARANCEUA Clear   PHUR 6.0   SPECGRAV 1.020   PROTEINUA 1+*   GLUCUA 4+*   KETONESU 1+*   BILIRUBINUA Negative   OCCULTUA 1+*   NITRITE Positive*   LEUKOCYTESUR Negative   RBCUA 10*   WBCUA 3   BACTERIA None   SQUAMEPITHEL 0   HYALINECASTS 0       Significant Imaging: I have reviewed all pertinent imaging results/findings within the past 24 hours.  Assessment/Plan:     * Acute encephalopathy  Likely 2/2 to drug use, but underlying COVID+ infection and possible UTI may be contributing factors. Utox negative, but reported history of recent methamphetamine use.     -- Patient continues to be agitated and verbally abusive to medical / nursing staff  -- ED PEC'ed patient prior to admission to . On my encounter, patient is uncooperative with encounter and therefore capacity is unable to be ascertained at this point  -- Capacity evaluation QD  -- PRN Zyprexa for non-redirectable agitation / violence  -- Treat underlying infection  -- See Bipolar Disorder A/P for home medications    Constipation  CTAP wc s/o moderate stool burden, diverticula w/o infection.     -- Aggressive bowel regimen      COVID  Patient presenting with incidental COVID+ 03/04/2024. No symptoms of dyspnea, shortness of breath. Presenting for unspecified weakness. No hypoxemia.  COVID-19 testing:   Isolation: Airborne/Droplet. Surgical mask on patient. Notify Infection Control    -- No O2 requirement at this time  -- COVID risk score: 5; will initiate High-Risk Remdesivir 3 day course  -- No role " for Dexamethasone d/t normal SpO2 on RA  -- No role for CAP coverage d/t lack of focal consolidation on CXR  -- No role for therapeutic AC d/t normal SpO2 on RA  -- Acetaminophen 650mg PO Q6hr PRN fever/headache  -- IVF if indicated, restrictive strategy preferred, no maintenance IV if able  -- antitussives: dextromethorphan/guaifenesin, tessalon PRN  -- albuterol: 2-4 puff q6h if patient wheezing        HCV antibody positive  Patient HCV quantitative count low in 03/2023. Per Hepatology review, if repeat count in 3 months was similar, treatment would not be indicated. 08/2023 count < 12.    -- Outpatient follow up    Acute cystitis without hematuria  UA with Nitrite+. Unable to discern symptoms d/t poor cooperation during ROS. Febrile to 101.5, although this may be related to COVID+. Previous UCX with largely pan-sensitive Proteus mirabilis.    -- No WBC or bacteria in UA makes acute UTI less certain; however, given Nitrite+ and new fever, will treat as UTI.  -- CTX 1 g x 5 days    Bipolar disorder  -- Continue home medication regimen: carbamazepime, venlafaxine, risperidone        Amphetamine use disorder, severe, dependence  See Polysubstance Use Disorder A/P      Type 1 diabetes  Patient's FSGs are uncontrolled due to hyperglycemia on current medication regimen.  Last A1c reviewed-   Lab Results   Component Value Date    HGBA1C 11.3 (H) 12/10/2023     Most recent fingerstick glucose reviewed-   Recent Labs   Lab 03/04/24  1733   POCTGLUCOSE 346*     Current correctional scale  Low    Antihyperglycemics (From admission, onward)      Start     Stop Route Frequency Ordered    03/05/24 0715  insulin aspart U-100 pen 2 Units         -- SubQ 3 times daily with meals 03/04/24 1904    03/04/24 2100  insulin detemir U-100 (Levemir) pen 10 Units         -- SubQ Nightly 03/04/24 1836            -- Hold oral hyperglycemics  -- SSI, POCT BG qACHS; titrate basal/bolus regimen PRN to 140-180 goal  -- Diabetic diet    CAD  (coronary artery disease)  Hyperlipidemia    Known CAD. Patient not exhibiting ACS symptoms at this time.     Last known LHC done 09/2023:  1.  60% in stent restenosis of previously placed right coronary artery stent.  2.  Successful balloon angioplasty with a 3 0 x 20 fall by 3.5 x 20 noncompliant balloon.  60% stenosis reduced to 0%.  YENI 3 flow.  No dissection.  3.  50% stenosis noted in the mid and distal left anterior descending artery.  There is a 70 % stenosis at the apex, small caliber.    -- Continue HI Statin  -- Continue ASA 81  -- Hold Plavix as patient's > 1 year from last PCI    Polysubstance use disorder  History of recent methamphetamine, tobacco, alcohol use.     -- Likely contributor to psychiatric disorder and agitation    Anemia of chronic disease  Patient's anemia is currently controlled. Has not received any PRBCs to date. Etiology likely d/t chronic disease due to recurrent infections, inflammation  Current CBC reviewed-   Lab Results   Component Value Date    HGB 12.5 (L) 03/04/2024    HCT 39.6 (L) 03/04/2024     Monitor serial CBC and transfuse if patient becomes hemodynamically unstable, symptomatic or H/H drops below 7/21.    Primary hypertension  Chronic, controlled. Latest blood pressure and vitals reviewed-     Temp:  [98.2 °F (36.8 °C)-101.5 °F (38.6 °C)]   Pulse:  []   Resp:  [20]   BP: (164-190)/()   SpO2:  [97 %-98 %] .   Home meds for hypertension were reviewed and noted below.   Hypertension Medications               losartan (COZAAR) 50 MG tablet Take 1 tablet (50 mg total) by mouth once daily.    metoprolol succinate (TOPROL-XL) 25 MG 24 hr tablet Take 1 tablet (25 mg total) by mouth once daily.          -- Restart losartan while IP; titrate up prior to restarting home rate-predominant BB       VTE Risk Mitigation (From admission, onward)           Ordered     enoxaparin injection 40 mg  Daily         03/04/24 1904     IP VTE HIGH RISK PATIENT  Once         03/04/24  1904     Place sequential compression device  Until discontinued         03/04/24 1904                                    Tariq-Ken Moran MD  Department of Hospital Medicine  [facility] - Emergency Dept           no

## 2024-03-06 ENCOUNTER — NON-APPOINTMENT (OUTPATIENT)
Age: 34
End: 2024-03-06

## 2024-03-06 ENCOUNTER — APPOINTMENT (OUTPATIENT)
Dept: PSYCHIATRY | Facility: CLINIC | Age: 34
End: 2024-03-06
Payer: COMMERCIAL

## 2024-03-06 PROCEDURE — 90837 PSYTX W PT 60 MINUTES: CPT | Mod: 95

## 2024-03-07 NOTE — REASON FOR VISIT
[Patient preference] : as per patient preference [Continuing, patient not seen in-person within last 12 months (provide details below)] : Telehealth services are continuing, patient not seen in-person within last 12 months.  [Telehealth (audio & video) - Individual/Group] : This visit was provided via telehealth using real-time 2-way audio visual technology. [Other Location: e.g. Home (Enter Location, City,State)___] : The provider was located at [unfilled]. [Verbal consent obtained from patient/other participant(s)] : Verbal consent for telehealth/telephonic services obtained from patient/other participant(s) [Home] : The patient, [unfilled], was located at home, [unfilled], at the time of the visit. [Patient] : Patient [FreeTextEntry5] : 11:53AM [FreeTextEntry4] : 11:00AM [FreeTextEntry1] : worry and stress associated with marital discord
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
## 2024-03-07 NOTE — PLAN
[Cognitive and/or Behavior Therapy] : Cognitive and/or Behavior Therapy  [Recommended Frequency of Visits: ____] : Recommended frequency of visits: [unfilled] [Return in ____ week(s)] : Return in [unfilled] week(s) [FreeTextEntry2] : Pt. has identified the following therapy goals thus far: Problem 1: Difficulty managing anger and unwanted expressions of anger (e.g., hitting ) Goal 1: Identify triggers for and alternative responses to anger Goal 2: Learn 1-2 skills for modifying anger-related cognitions Problem 2: Difficulty managing anxiety  Goal 1: Reduce HITESH-7 score by 30% Goal 2: Learn to anchor in the present (i.e., mindful emotion awareness).  Goal 3: Learn 2-3 skills for modifying unhelpful cognitions      [de-identified] : Pt. reported that she experienced an increase in anxiety in the last week because her  may be shifting to working nights. Pt. explained that she was worried that his transition to night shifts would negatively impact his emotional well-being, and ultimately lead him to make decisions that would negatively impact their marriage. Writer and pt. worked together to discern concerns that pt. could take action on (e.g., pt. could have a conversation with her  about how they could work together to prepare for this change) from worries that were thought distortions. Writer and pt. engaged in Socratic dialogue to challenge thought distortions. Pt. was receptive to Socratic dialogue and agreed to continue to practice challenging unhelpful thoughts as homework.

## 2024-03-07 NOTE — END OF VISIT
[Duration of Psychotherapy Visit (minutes spent in synchronous communication): ____] : Duration of Psychotherapy Visit (minutes spent in synchronous communication): [unfilled] [Licensed Clinician] : Licensed Clinician [Individual Psychotherapy for 53+ minutes] : Individual Psychotherapy for 53+ minutes

## 2024-03-09 ENCOUNTER — NON-APPOINTMENT (OUTPATIENT)
Age: 34
End: 2024-03-09

## 2024-03-15 ENCOUNTER — APPOINTMENT (OUTPATIENT)
Dept: PSYCHIATRY | Facility: CLINIC | Age: 34
End: 2024-03-15
Payer: COMMERCIAL

## 2024-03-15 PROCEDURE — 90834 PSYTX W PT 45 MINUTES: CPT | Mod: 95

## 2024-03-19 NOTE — REASON FOR VISIT
[Patient preference] : as per patient preference [Continuing, patient not seen in-person within last 12 months (provide details below)] : Telehealth services are continuing, patient not seen in-person within last 12 months.  [Other Location: e.g. Home (Enter Location, City,State)___] : The provider was located at [unfilled]. [Telehealth (audio & video) - Individual/Group] : This visit was provided via telehealth using real-time 2-way audio visual technology. [Home] : The patient, [unfilled], was located at home, [unfilled], at the time of the visit. [Verbal consent obtained from patient/other participant(s)] : Verbal consent for telehealth/telephonic services obtained from patient/other participant(s) [FreeTextEntry4] : 2:00PM [FreeTextEntry5] : 2:43PM [Patient] : Patient [FreeTextEntry1] : worry and stress associated with marital discord

## 2024-03-19 NOTE — PHYSICAL EXAM
[Average] : average [Anxious] : anxious [Cooperative] : cooperative [Clear] : clear [Full] : full [Linear/Goal Directed] : linear/goal directed [WNL] : within normal limits

## 2024-03-19 NOTE — PLAN
[FreeTextEntry2] : Pt. has identified the following therapy goals thus far: Problem 1: Difficulty managing anger and unwanted expressions of anger (e.g., hitting ) Goal 1: Identify triggers for and alternative responses to anger Goal 2: Learn 1-2 skills for modifying anger-related cognitions Problem 2: Difficulty managing anxiety  Goal 1: Reduce HITESH-7 score by 30% Goal 2: Learn to anchor in the present (i.e., mindful emotion awareness).  Goal 3: Learn 2-3 skills for modifying unhelpful cognitions      [Cognitive and/or Behavior Therapy] : Cognitive and/or Behavior Therapy  [Recommended Frequency of Visits: ____] : Recommended frequency of visits: [unfilled] [de-identified] : Pt. reported that she completed the homework of having a discussion with her  about how they could work together to prepare for her 's transition to nightshifts. Pt. explained that collaborative problem-solving with her  was helpful. Writer and pt. continued to engage in Socratic dialogue to challenge unhelpful thoughts pt. was endorsing. Writer and pt. reflected on pt.'s overall progress in managing anxiety and frustration. Pt. explained that through therapy she has observed progress in her ability to identify and challenge unhelpful thoughts, interrupt ruminative thought processes, and take "time outs" when frustrated. Writer and pt. agreed that transition to biweekly sessions would be appropriate at this time.  [Return in ____ week(s)] : Return in [unfilled] week(s)

## 2024-03-28 ENCOUNTER — APPOINTMENT (OUTPATIENT)
Dept: PSYCHIATRY | Facility: CLINIC | Age: 34
End: 2024-03-28
Payer: COMMERCIAL

## 2024-03-28 PROCEDURE — 90837 PSYTX W PT 60 MINUTES: CPT | Mod: 95

## 2024-03-29 NOTE — PHYSICAL EXAM
[Average] : average [Anxious] : anxious [Cooperative] : cooperative [Full] : full [Clear] : clear [Linear/Goal Directed] : linear/goal directed [WNL] : within normal limits

## 2024-03-29 NOTE — PLAN
[FreeTextEntry2] : Pt. has identified the following therapy goals thus far: Problem 1: Difficulty managing anger and unwanted expressions of anger (e.g., hitting ) Goal 1: Identify triggers for and alternative responses to anger Goal 2: Learn 1-2 skills for modifying anger-related cognitions Problem 2: Difficulty managing anxiety  Goal 1: Reduce HITESH-7 score by 30% Goal 2: Learn to anchor in the present (i.e., mindful emotion awareness).  Goal 3: Learn 2-3 skills for modifying unhelpful cognitions      [de-identified] : Pt. reported that she was effectively able to manage worry related to her 's possible impending promotion. Pt. identified cognitive restructuring strategies that she used for management of worry, which writer reinforced. Writer and pt. reflected on recent ruminative thoughts pt. experienced. Writer and pt. engaged in Socratic dialogue to challenge those thoughts and reviewed strategies pt. could use in the coming weeks to continue to manage rumination. [Cognitive and/or Behavior Therapy] : Cognitive and/or Behavior Therapy  [Recommended Frequency of Visits: ____] : Recommended frequency of visits: [unfilled] [Return in ____ week(s)] : Return in [unfilled] week(s)

## 2024-03-29 NOTE — REASON FOR VISIT
[Patient preference] : as per patient preference [Continuing, patient not seen in-person within last 12 months (provide details below)] : Telehealth services are continuing, patient not seen in-person within last 12 months.  [Telehealth (audio & video) - Individual/Group] : This visit was provided via telehealth using real-time 2-way audio visual technology. [Other Location: e.g. Home (Enter Location, City,State)___] : The provider was located at [unfilled]. [Home] : The patient, [unfilled], was located at home, [unfilled], at the time of the visit. [Verbal consent obtained from patient/other participant(s)] : Verbal consent for telehealth/telephonic services obtained from patient/other participant(s) [FreeTextEntry4] : 12:00PM [Patient] : Patient [FreeTextEntry5] : 12:56PM [FreeTextEntry1] : worry and stress associated with marital discord

## 2024-04-09 ENCOUNTER — APPOINTMENT (OUTPATIENT)
Dept: PSYCHIATRY | Facility: CLINIC | Age: 34
End: 2024-04-09
Payer: COMMERCIAL

## 2024-04-09 PROCEDURE — 90837 PSYTX W PT 60 MINUTES: CPT | Mod: 95

## 2024-04-19 ENCOUNTER — NON-APPOINTMENT (OUTPATIENT)
Age: 34
End: 2024-04-19

## 2024-04-19 ENCOUNTER — APPOINTMENT (OUTPATIENT)
Dept: PSYCHIATRY | Facility: CLINIC | Age: 34
End: 2024-04-19
Payer: COMMERCIAL

## 2024-04-19 PROCEDURE — 90834 PSYTX W PT 45 MINUTES: CPT | Mod: 95

## 2024-04-23 NOTE — REASON FOR VISIT
[Patient preference] : as per patient preference [Continuing, patient not seen in-person within last 12 months (provide details below)] : Telehealth services are continuing, patient not seen in-person within last 12 months.  [Telehealth (audio & video) - Individual/Group] : This visit was provided via telehealth using real-time 2-way audio visual technology. [Other Location: e.g. Home (Enter Location, City,State)___] : The provider was located at [unfilled]. [Home] : The patient, [unfilled], was located at home, [unfilled], at the time of the visit. [Verbal consent obtained from patient/other participant(s)] : Verbal consent for telehealth/telephonic services obtained from patient/other participant(s) [Patient] : Patient [FreeTextEntry4] : 3:00PM [FreeTextEntry5] : 3:45PM [FreeTextEntry1] : worry and stress associated with marital discord

## 2024-04-23 NOTE — PLAN
[Cognitive and/or Behavior Therapy] : Cognitive and/or Behavior Therapy  [Recommended Frequency of Visits: ____] : Recommended frequency of visits: [unfilled] [FreeTextEntry2] : Pt. has identified the following therapy goals thus far: Problem 1: Difficulty managing anger and unwanted expressions of anger (e.g., hitting ) Goal 1: Identify triggers for and alternative responses to anger Goal 2: Learn 1-2 skills for modifying anger-related cognitions Problem 2: Difficulty managing anxiety  Goal 1: Reduce HITESH-7 score by 30% Goal 2: Learn to anchor in the present (i.e., mindful emotion awareness).  Goal 3: Learn 2-3 skills for modifying unhelpful cognitions      [de-identified] : Writer and pt. reviewed pt.'s progress in implementing thought restructuring and defusion strategies. Pt. explained that she continues to struggle with resentment towards her  for his prior affair. Writer and pt. engaged in Socratic dialogue to collaboratively challenge resentment-related thoughts. Pt. was receptive to Socratic dialogue. Pt. noted that she would be traveling out of the country with family, therefore RTC in 3 weeks.   [Return in ____ week(s)] : Return in [unfilled] week(s)

## 2024-04-23 NOTE — PLAN
[Cognitive and/or Behavior Therapy] : Cognitive and/or Behavior Therapy  [Recommended Frequency of Visits: ____] : Recommended frequency of visits: [unfilled] [Return in ____ week(s)] : Return in [unfilled] week(s) [FreeTextEntry2] : Pt. has identified the following therapy goals thus far: Problem 1: Difficulty managing anger and unwanted expressions of anger (e.g., hitting ) Goal 1: Identify triggers for and alternative responses to anger Goal 2: Learn 1-2 skills for modifying anger-related cognitions Problem 2: Difficulty managing anxiety  Goal 1: Reduce HITESH-7 score by 30% Goal 2: Learn to anchor in the present (i.e., mindful emotion awareness).  Goal 3: Learn 2-3 skills for modifying unhelpful cognitions      [de-identified] : Pt. reported that she is continuing to implement cognitive restructuring strategies for management of worry and anxiety, which writer reinforced. Pt. explained that she experienced a worsening of sleep quality in the last two weeks. Writer and pt. reviewed pt.'s sleep behaviors and psychoeducation about sleep hygiene. For homework, pt. agreed to push her bedtime back later so as to increase sleep drive. Writer and pt. then reviewed how pt. can implement thought defusion strategies for those thoughts that remain intrusive even after cognitive restructuring.  RTC in 1 week rather than 2 because of pt.'s upcoming travel schedule (i.e., pt. did not want to go 4 weeks w/o meeting).

## 2024-04-23 NOTE — REASON FOR VISIT
[Patient preference] : as per patient preference [Continuing, patient not seen in-person within last 12 months (provide details below)] : Telehealth services are continuing, patient not seen in-person within last 12 months.  [Telehealth (audio & video) - Individual/Group] : This visit was provided via telehealth using real-time 2-way audio visual technology. [Other Location: e.g. Home (Enter Location, City,State)___] : The provider was located at [unfilled]. [Home] : The patient, [unfilled], was located at home, [unfilled], at the time of the visit. [Verbal consent obtained from patient/other participant(s)] : Verbal consent for telehealth/telephonic services obtained from patient/other participant(s) [Patient] : Patient [FreeTextEntry4] : 2:00PM [FreeTextEntry5] : 2:51PM [FreeTextEntry1] : worry and stress associated with marital discord

## 2024-05-09 ENCOUNTER — APPOINTMENT (OUTPATIENT)
Dept: PSYCHIATRY | Facility: CLINIC | Age: 34
End: 2024-05-09
Payer: COMMERCIAL

## 2024-05-09 PROCEDURE — 90837 PSYTX W PT 60 MINUTES: CPT | Mod: 95

## 2024-05-09 NOTE — REASON FOR VISIT
[Patient preference] : as per patient preference [Continuing, patient not seen in-person within last 12 months (provide details below)] : Telehealth services are continuing, patient not seen in-person within last 12 months.  [Telehealth (audio & video) - Individual/Group] : This visit was provided via telehealth using real-time 2-way audio visual technology. [Other Location: e.g. Home (Enter Location, City,State)___] : The provider was located at [unfilled]. [Home] : The patient, [unfilled], was located at home, [unfilled], at the time of the visit. [Verbal consent obtained from patient/other participant(s)] : Verbal consent for telehealth/telephonic services obtained from patient/other participant(s) [FreeTextEntry4] : 12:02PM [FreeTextEntry5] : 12:57PM [Patient] : Patient [FreeTextEntry1] : worry and stress associated with marital discord

## 2024-05-09 NOTE — PLAN
[FreeTextEntry2] : Pt. has identified the following therapy goals thus far: Problem 1: Difficulty managing anger and unwanted expressions of anger (e.g., hitting ) Goal 1: Identify triggers for and alternative responses to anger Goal 2: Learn 1-2 skills for modifying anger-related cognitions Problem 2: Difficulty managing anxiety  Goal 1: Reduce HITESH-7 score by 30% Goal 2: Learn to anchor in the present (i.e., mindful emotion awareness).  Goal 3: Learn 2-3 skills for modifying unhelpful cognitions      [Cognitive and/or Behavior Therapy] : Cognitive and/or Behavior Therapy  [de-identified] : Pt. reported that she and her family had a pleasant vacation, and that she got along well with her  while they were traveling. Pt. reported that she wanted to focus in this session on discussing how to assertively communicate with her in-laws, who she has a strained relationship with. Pt. described recent interactions. Writer and pt. collaboratively identified a boundary that pt. plans to set and discussed how pt. could set that boundary. [Recommended Frequency of Visits: ____] : Recommended frequency of visits: [unfilled] [Return in ____ week(s)] : Return in [unfilled] week(s)

## 2024-05-21 ENCOUNTER — APPOINTMENT (OUTPATIENT)
Dept: PSYCHIATRY | Facility: CLINIC | Age: 34
End: 2024-05-21
Payer: COMMERCIAL

## 2024-05-21 PROCEDURE — 90834 PSYTX W PT 45 MINUTES: CPT | Mod: 95

## 2024-05-23 NOTE — REASON FOR VISIT
[Patient preference] : as per patient preference [Continuing, patient not seen in-person within last 12 months (provide details below)] : Telehealth services are continuing, patient not seen in-person within last 12 months.  [Telehealth (audio & video) - Individual/Group] : This visit was provided via telehealth using real-time 2-way audio visual technology. [Other Location: e.g. Home (Enter Location, City,State)___] : The provider was located at [unfilled]. [Home] : The patient, [unfilled], was located at home, [unfilled], at the time of the visit. [Verbal consent obtained from patient/other participant(s)] : Verbal consent for telehealth/telephonic services obtained from patient/other participant(s) [FreeTextEntry4] : 3:03PM [Patient] : Patient [FreeTextEntry5] : 3:54PM [FreeTextEntry1] : worry and stress associated with marital discord

## 2024-05-23 NOTE — PLAN
[FreeTextEntry2] : Pt. has identified the following therapy goals thus far: Problem 1: Difficulty managing anger and unwanted expressions of anger (e.g., hitting ) Goal 1: Identify triggers for and alternative responses to anger Goal 2: Learn 1-2 skills for modifying anger-related cognitions Problem 2: Difficulty managing anxiety  Goal 1: Reduce HITESH-7 score by 30% Goal 2: Learn to anchor in the present (i.e., mindful emotion awareness).  Goal 3: Learn 2-3 skills for modifying unhelpful cognitions      [Cognitive and/or Behavior Therapy] : Cognitive and/or Behavior Therapy  [de-identified] : Pt. reported that she and her  are considering buying a new home. Pt. reported that she was feeling excited about this decision, but also very anxious. Writer and pt. collaboratively identified an intermediate belief that was contributing to pt.'s anxiety. Writer engaged pt. in Socratic dialogue to challenge that belief. Pt. was receptive, though she noted hesitation about letting go of her rules and assumptions. For homework, pt. therefore agreed to write out the costs and benefits of the intermediate belief identified in session.  RTC in 1 week (instead if 2 weeks) because will be out of town the following week and did not want to go three weeks without meeting. [Recommended Frequency of Visits: ____] : Recommended frequency of visits: [unfilled] [Return in ____ week(s)] : Return in [unfilled] week(s)

## 2024-05-29 ENCOUNTER — APPOINTMENT (OUTPATIENT)
Dept: PSYCHIATRY | Facility: CLINIC | Age: 34
End: 2024-05-29
Payer: COMMERCIAL

## 2024-05-29 PROCEDURE — 90837 PSYTX W PT 60 MINUTES: CPT | Mod: 95

## 2024-05-31 NOTE — PLAN
[FreeTextEntry2] : Pt. has identified the following therapy goals thus far: Problem 1: Difficulty managing anger and unwanted expressions of anger (e.g., hitting ) Goal 1: Identify triggers for and alternative responses to anger Goal 2: Learn 1-2 skills for modifying anger-related cognitions Problem 2: Difficulty managing anxiety  Goal 1: Reduce HITESH-7 score by 30% Goal 2: Learn to anchor in the present (i.e., mindful emotion awareness).  Goal 3: Learn 2-3 skills for modifying unhelpful cognitions      [Cognitive and/or Behavior Therapy] : Cognitive and/or Behavior Therapy  [de-identified] : Pt. completed the homework of identifying costs and benefits of an intermediate belief that was discussed in prior session. Writer and pt. collaboratively weighed costs and benefits of this belief, and pt. agreed that the costs outweigh the benefits. Pt. identified several behaviors she could engage during the next week to counter this belief.  [Recommended Frequency of Visits: ____] : Recommended frequency of visits: [unfilled] [Return in ____ week(s)] : Return in [unfilled] week(s)

## 2024-05-31 NOTE — REASON FOR VISIT
[Patient preference] : as per patient preference [Continuing, patient not seen in-person within last 12 months (provide details below)] : Telehealth services are continuing, patient not seen in-person within last 12 months.  [Telehealth (audio & video) - Individual/Group] : This visit was provided via telehealth using real-time 2-way audio visual technology. [Other Location: e.g. Home (Enter Location, City,State)___] : The provider was located at [unfilled]. [Home] : The patient, [unfilled], was located at home, [unfilled], at the time of the visit. [Verbal consent obtained from patient/other participant(s)] : Verbal consent for telehealth/telephonic services obtained from patient/other participant(s) [FreeTextEntry4] : 11:00AM [FreeTextEntry5] : 11:53AM [Patient] : Patient [FreeTextEntry1] : worry and stress associated with marital discord

## 2024-05-31 NOTE — PHYSICAL EXAM
2 [Average] : average [Cooperative] : cooperative [Anxious] : anxious [Full] : full [Clear] : clear [Linear/Goal Directed] : linear/goal directed [WNL] : within normal limits

## 2024-06-02 ENCOUNTER — EMERGENCY (EMERGENCY)
Facility: HOSPITAL | Age: 34
LOS: 1 days | Discharge: HOME | End: 2024-06-02
Attending: EMERGENCY MEDICINE | Admitting: EMERGENCY MEDICINE
Payer: COMMERCIAL

## 2024-06-02 VITALS
HEART RATE: 68 BPM | OXYGEN SATURATION: 100 % | RESPIRATION RATE: 16 BRPM | DIASTOLIC BLOOD PRESSURE: 69 MMHG | TEMPERATURE: 98 F | SYSTOLIC BLOOD PRESSURE: 126 MMHG

## 2024-06-02 VITALS
OXYGEN SATURATION: 100 % | HEART RATE: 78 BPM | TEMPERATURE: 98 F | SYSTOLIC BLOOD PRESSURE: 134 MMHG | DIASTOLIC BLOOD PRESSURE: 74 MMHG | RESPIRATION RATE: 16 BRPM

## 2024-06-02 DIAGNOSIS — Z90.79 ACQUIRED ABSENCE OF OTHER GENITAL ORGAN(S): Chronic | ICD-10-CM

## 2024-06-02 DIAGNOSIS — Z98.890 OTHER SPECIFIED POSTPROCEDURAL STATES: Chronic | ICD-10-CM

## 2024-06-02 LAB
ALBUMIN SERPL ELPH-MCNC: 4.4 G/DL — SIGNIFICANT CHANGE UP (ref 3.3–5)
ALP SERPL-CCNC: 53 U/L — SIGNIFICANT CHANGE UP (ref 40–120)
ALT FLD-CCNC: 8 U/L — SIGNIFICANT CHANGE UP (ref 4–33)
ANION GAP SERPL CALC-SCNC: 14 MMOL/L — SIGNIFICANT CHANGE UP (ref 7–14)
APTT BLD: 27.8 SEC — SIGNIFICANT CHANGE UP (ref 24.5–35.6)
AST SERPL-CCNC: 12 U/L — SIGNIFICANT CHANGE UP (ref 4–32)
BASE EXCESS BLDV CALC-SCNC: -1.8 MMOL/L — SIGNIFICANT CHANGE UP (ref -2–3)
BASOPHILS # BLD AUTO: 0.04 K/UL — SIGNIFICANT CHANGE UP (ref 0–0.2)
BASOPHILS NFR BLD AUTO: 0.4 % — SIGNIFICANT CHANGE UP (ref 0–2)
BILIRUB SERPL-MCNC: <0.2 MG/DL — SIGNIFICANT CHANGE UP (ref 0.2–1.2)
BLOOD GAS VENOUS COMPREHENSIVE RESULT: SIGNIFICANT CHANGE UP
BUN SERPL-MCNC: 19 MG/DL — SIGNIFICANT CHANGE UP (ref 7–23)
CALCIUM SERPL-MCNC: 9.3 MG/DL — SIGNIFICANT CHANGE UP (ref 8.4–10.5)
CHLORIDE BLDV-SCNC: 108 MMOL/L — SIGNIFICANT CHANGE UP (ref 96–108)
CHLORIDE SERPL-SCNC: 107 MMOL/L — SIGNIFICANT CHANGE UP (ref 98–107)
CK SERPL-CCNC: 58 U/L — SIGNIFICANT CHANGE UP (ref 25–170)
CO2 BLDV-SCNC: 24.3 MMOL/L — SIGNIFICANT CHANGE UP (ref 22–26)
CO2 SERPL-SCNC: 19 MMOL/L — LOW (ref 22–31)
CREAT SERPL-MCNC: 0.53 MG/DL — SIGNIFICANT CHANGE UP (ref 0.5–1.3)
EGFR: 124 ML/MIN/1.73M2 — SIGNIFICANT CHANGE UP
EOSINOPHIL # BLD AUTO: 0.1 K/UL — SIGNIFICANT CHANGE UP (ref 0–0.5)
EOSINOPHIL NFR BLD AUTO: 1.1 % — SIGNIFICANT CHANGE UP (ref 0–6)
GAS PNL BLDV: 136 MMOL/L — SIGNIFICANT CHANGE UP (ref 136–145)
GLUCOSE BLDV-MCNC: 102 MG/DL — HIGH (ref 70–99)
GLUCOSE SERPL-MCNC: 102 MG/DL — HIGH (ref 70–99)
HCG SERPL-ACNC: <1 MIU/ML — SIGNIFICANT CHANGE UP
HCO3 BLDV-SCNC: 23 MMOL/L — SIGNIFICANT CHANGE UP (ref 22–29)
HCT VFR BLD CALC: 36.2 % — SIGNIFICANT CHANGE UP (ref 34.5–45)
HCT VFR BLDA CALC: 38 % — SIGNIFICANT CHANGE UP (ref 34.5–46.5)
HGB BLD CALC-MCNC: 12.7 G/DL — SIGNIFICANT CHANGE UP (ref 11.7–16.1)
HGB BLD-MCNC: 12.6 G/DL — SIGNIFICANT CHANGE UP (ref 11.5–15.5)
IANC: 6.02 K/UL — SIGNIFICANT CHANGE UP (ref 1.8–7.4)
IMM GRANULOCYTES NFR BLD AUTO: 0.1 % — SIGNIFICANT CHANGE UP (ref 0–0.9)
INR BLD: 0.9 RATIO — SIGNIFICANT CHANGE UP (ref 0.85–1.18)
LACTATE BLDV-MCNC: 1.2 MMOL/L — SIGNIFICANT CHANGE UP (ref 0.5–2)
LYMPHOCYTES # BLD AUTO: 2.45 K/UL — SIGNIFICANT CHANGE UP (ref 1–3.3)
LYMPHOCYTES # BLD AUTO: 26.5 % — SIGNIFICANT CHANGE UP (ref 13–44)
MAGNESIUM SERPL-MCNC: 2.1 MG/DL — SIGNIFICANT CHANGE UP (ref 1.6–2.6)
MCHC RBC-ENTMCNC: 33.2 PG — SIGNIFICANT CHANGE UP (ref 27–34)
MCHC RBC-ENTMCNC: 34.8 GM/DL — SIGNIFICANT CHANGE UP (ref 32–36)
MCV RBC AUTO: 95.3 FL — SIGNIFICANT CHANGE UP (ref 80–100)
MONOCYTES # BLD AUTO: 0.64 K/UL — SIGNIFICANT CHANGE UP (ref 0–0.9)
MONOCYTES NFR BLD AUTO: 6.9 % — SIGNIFICANT CHANGE UP (ref 2–14)
NEUTROPHILS # BLD AUTO: 6.02 K/UL — SIGNIFICANT CHANGE UP (ref 1.8–7.4)
NEUTROPHILS NFR BLD AUTO: 65 % — SIGNIFICANT CHANGE UP (ref 43–77)
NRBC # BLD: 0 /100 WBCS — SIGNIFICANT CHANGE UP (ref 0–0)
NRBC # FLD: 0 K/UL — SIGNIFICANT CHANGE UP (ref 0–0)
PCO2 BLDV: 39 MMHG — SIGNIFICANT CHANGE UP (ref 39–52)
PH BLDV: 7.38 — SIGNIFICANT CHANGE UP (ref 7.32–7.43)
PHOSPHATE SERPL-MCNC: 3.7 MG/DL — SIGNIFICANT CHANGE UP (ref 2.5–4.5)
PLATELET # BLD AUTO: 259 K/UL — SIGNIFICANT CHANGE UP (ref 150–400)
PO2 BLDV: 59 MMHG — HIGH (ref 25–45)
POTASSIUM BLDV-SCNC: 4.4 MMOL/L — SIGNIFICANT CHANGE UP (ref 3.5–5.1)
POTASSIUM SERPL-MCNC: 4.2 MMOL/L — SIGNIFICANT CHANGE UP (ref 3.5–5.3)
POTASSIUM SERPL-SCNC: 4.2 MMOL/L — SIGNIFICANT CHANGE UP (ref 3.5–5.3)
PROT SERPL-MCNC: 6.7 G/DL — SIGNIFICANT CHANGE UP (ref 6–8.3)
PROTHROM AB SERPL-ACNC: 10.1 SEC — SIGNIFICANT CHANGE UP (ref 9.5–13)
RBC # BLD: 3.8 M/UL — SIGNIFICANT CHANGE UP (ref 3.8–5.2)
RBC # FLD: 12.2 % — SIGNIFICANT CHANGE UP (ref 10.3–14.5)
SAO2 % BLDV: 88.5 % — HIGH (ref 67–88)
SODIUM SERPL-SCNC: 140 MMOL/L — SIGNIFICANT CHANGE UP (ref 135–145)
TROPONIN T, HIGH SENSITIVITY RESULT: 7 NG/L — SIGNIFICANT CHANGE UP
TROPONIN T, HIGH SENSITIVITY RESULT: 9 NG/L — SIGNIFICANT CHANGE UP
WBC # BLD: 9.26 K/UL — SIGNIFICANT CHANGE UP (ref 3.8–10.5)
WBC # FLD AUTO: 9.26 K/UL — SIGNIFICANT CHANGE UP (ref 3.8–10.5)

## 2024-06-02 PROCEDURE — 70450 CT HEAD/BRAIN W/O DYE: CPT | Mod: 26,MC

## 2024-06-02 PROCEDURE — 93010 ELECTROCARDIOGRAM REPORT: CPT

## 2024-06-02 PROCEDURE — 70486 CT MAXILLOFACIAL W/O DYE: CPT | Mod: 26,MC

## 2024-06-02 PROCEDURE — 99285 EMERGENCY DEPT VISIT HI MDM: CPT

## 2024-06-02 PROCEDURE — 71046 X-RAY EXAM CHEST 2 VIEWS: CPT | Mod: 26

## 2024-06-02 RX ORDER — ACETAMINOPHEN 500 MG
975 TABLET ORAL ONCE
Refills: 0 | Status: COMPLETED | OUTPATIENT
Start: 2024-06-02 | End: 2024-06-02

## 2024-06-02 RX ADMIN — Medication 975 MILLIGRAM(S): at 20:06

## 2024-06-02 NOTE — ED PROVIDER NOTE - PATIENT PORTAL LINK FT
You can access the FollowMyHealth Patient Portal offered by [facility] by registering at the following website: [email or web address]. By joining [email or web address]’s FollowMyHealth portal, you will also be able to view your health information using other applications (apps) compatible with our system.

## 2024-06-02 NOTE — ED PROVIDER NOTE - NSFOLLOWUPINSTRUCTIONS_ED_ALL_ED_FT
You were seen in the Emergency Department for a syncopal episode.  You received an EKG, lab work, chest x-ray, CT which not find acute reason for your symptoms.  You were noted to have what appears to be an arachnoid cyst.  Please follow-up with the neurologist for further management.  Please also follow-up with a cardiologist for further management of your syncope.    1) Advance activity as tolerated.   2) Continue all previously prescribed medications as directed.    3) Follow up with your neurologist and cardiologist for further management of your arachnoid cyst and syncope respectively- take copies of your results.    4) Return to the Emergency Department for worsening or persistent symptoms, and/or ANY NEW OR CONCERNING SYMPTOMS.

## 2024-06-02 NOTE — ED PROVIDER NOTE - OBJECTIVE STATEMENT
34-year-old female with past medical history anxiety, has Klonopin as needed, presenting to ED with episode of syncope.  Patient states that she was talking her daughter, when she lost consciousness.  When patient awakened, she was around 10 feet from where she lost consciousness.  As per the baby monitor and her phone she estimates her syncope lasting around 2 minutes.  Patient denies any prodromal symptoms including lightheadedness, dizziness, chest pain, shortness of breath, vision changes.  Patient does have a history of lightheadedness, has seen cardiology and ENT in the past for workup for her lightheadedness which was typically in the setting of pregnancy.  Patient last saw cardiologist back in 2022.  Denies any recent fevers, chills, cough, nausea, vomiting, diarrhea.  Patient denies a history of syncope happening previously.  Unclear of any abnormal movements, did state that she was confused when she woke up.

## 2024-06-02 NOTE — ED ADULT NURSE NOTE - NSFALLUNIVINTERV_ED_ALL_ED
Bed/Stretcher in lowest position, wheels locked, appropriate side rails in place/Call bell, personal items and telephone in reach/Instruct patient to call for assistance before getting out of bed/chair/stretcher/Non-slip footwear applied when patient is off stretcher/[place] to call system/Physically safe environment - no spills, clutter or unnecessary equipment/Purposeful proactive rounding/Room/bathroom lighting operational, light cord in reach

## 2024-06-02 NOTE — ED PROVIDER NOTE - ATTENDING CONTRIBUTION TO CARE
Brief HPI:  34-year-old female no significant past medical history presents with episode of loss of consciousness earlier today.  Patient states she was in her home and experienced "blackout".  This was unwitnessed, however after patient regained consciousness she called her  who stated she sounded confused.  Patient states she struck the left side of her face and chin on the ground.  Denies tongue biting or urinary incontinence.  No history of seizure in the past.  Aside from left-sided facial pain, patient does not have any other symptoms at the moment.  Reports episodes of lightheaded in the past during her pregnancies, for which she has had negative cardiac and neurological workups including TTE and MRI.  Not on exogenous estrogen.  No history of DVT or PE.  Patient occasionally takes Klonopin, but not every day.  No family history of early cardiac death.  Denies tobacco, alcohol or illicit drug use.    Vitals:   Reviewed    Exam:    GEN:  Non-toxic appearing, non-distressed, speaking full sentences, non-diaphoretic, AAOx3  HEENT:  Superficial abrasion to left superior periorbital area left-sided maxilla, neck supple, EOMI, PERRLA, sclera anicteric, no conjunctival pallor or injection, no stridor, normal voice, no tonsillar exudate, uvula midline  CV:  regular rhythm and rate, s1/s2 audible, no murmurs, rubs or gallops, peripheral pulses 2+ and symmetric  PULM:  non-labored respirations, lungs clear to auscultation bilaterally, no wheezes, crackles or rales  ABD:  non distended, non-tender, no rebound, no guarding, negative Vick's sign, bowel sounds normal, no cvat  MSK:  no gross deformity, non-tender extremities and joints, range of motion grossly normal appearing, no extremity edema, extremities warm and well perfused   NEURO:  AAOx3, CN II-XII intact, motor 5/5 in upper and lower extremities bilaterally, sensation grossly intact in extremities and trunk, finger to nose testing wnl, no nystagmus, negative Romberg, no pronator drift, no gait deficit  SKIN:  warm, dry, no rash or vesicles     A/P:  34-year-old female no significant past medical history presents with episode of loss of consciousness earlier today. Vital signs stable.  EKG non-ischemic; no morphology consistent with Brugada syndrome, Abrams-Parkinson-White syndrome, prolonged qt interval, HOCM, or arrythmogenic right ventricular hyperplasia.  Presentation consistent with syncope, although seizure considered given acute onset loss of consciousness which was unwitnessed.  Will send labs, CT imaging, supportive care.  Disposition pending.

## 2024-06-02 NOTE — ED ADULT NURSE NOTE - OBJECTIVE STATEMENT
Patient presented to ED with a chief complaint of a syncopal episode at home caught by the baby monitor. Patient states that she synopsized for about 2 minutes. States that in the past she was worked up for feelings of passing out and the findings were unremarkable. Denies fever, chills, n/v/d, recent illness, chest pain, SOB.    Patient is A/O x 4, NAD, skin intact, PERRLA, speech clear, neurologically intact with no deficits, strength b/l upper and lower extremties 5/5, sensation intact b/l upper and lower extremities, rate and rhythm regular S1 and S2 heard with no murmurs radial and pedal pulses present, capillary refill <3 , lungs clear b/l lobes throughout with no adventitious sounds or accessory muscle use, even and unlabored, abdomen soft and non-tender, bowel sounds heard x 4 quadrant, no edema noted. Safety maintained, bed in lowest position, call bell within reach, plan of care reviewed with patient , addressed all questions and concern, will continue to monitor patient.

## 2024-06-02 NOTE — ED PROVIDER NOTE - PHYSICAL EXAMINATION
Const: not in acute distress  Eyes: no conjunctival injection  HEENT: Head NCAT, Moist MM.  Neck: Trachea midline.   CVS: +S1/S2, Peripheral pulses 2+ and equal in all extremities.  RESP: Unlabored respiratory effort. Clear to auscultation bilaterally.  GI: Nontender/Nondistended, No CVA tenderness b/l.   MSK: Normocephalic/Atraumatic, No Lower Extremities edema b/l.   Skin: Intact.   Neuro: CNs II-XII grossly intact. Motor & Sensation grossly intact.  Psych: Awake, Alert, & Cooperative

## 2024-06-02 NOTE — ED PROVIDER NOTE - CLINICAL SUMMARY MEDICAL DECISION MAKING FREE TEXT BOX
34-year-old female with past medical history anxiety, has Klonopin as needed, presenting to ED with episode of syncope.  Hemodynamically stable.  Physical exam with heart regular rate and rhythm, lungs clear to auscultation, abdomen soft nondistended tender.  Patient neuro intact.  Concern for seizure, cardiac syncope, intracranial pathology.  Will get labs including CBC, CMP, troponin, coags, hCG, CK, VBG, chest x-ray, CT head max face.  Given lack of prodromal, will likely offer CDU for cardiac monitoring and echo.

## 2024-06-02 NOTE — ED PROVIDER NOTE - PROGRESS NOTE DETAILS
MD Angela (PGY-2) patient's labs and imaging reviewed.  No acute findings.  Patient CT head did show administrate retrocerebellar cyst likely arachnoid cyst.  Will have patient follow-up with her neurologist and cardiologist.  Given patient's lack of prodrome, offered patient CDU for further cardiac evaluation including echo and cardiac monitoring.  Had extensive discussion, patient would prefer to go home and follow-up a patient.  Discussed with pt results of work up, strict return precautions, and need for follow up.  Pt expressed understanding and agrees with plan.

## 2024-06-02 NOTE — ED ADULT NURSE REASSESSMENT NOTE - NS ED NURSE REASSESS COMMENT FT1
Received report from DIXON martinez. Patient received in spot 2. pt remains at baseline mental status, RR even unlabored completing full sentences. pt resting in stretcher comfortably at this time, no new complaints offered. stretcher lowest position siderails up safety measures in place. Pending labR and CT. Call bell within reach. NSR on monitor.

## 2024-06-02 NOTE — ED ADULT TRIAGE NOTE - CHIEF COMPLAINT QUOTE
pt states she was at top of steps today and past out /  pt was out for about 2 min as per baby monitor   denies cp or sob prior/

## 2024-06-05 ENCOUNTER — APPOINTMENT (OUTPATIENT)
Dept: CARDIOLOGY | Facility: CLINIC | Age: 34
End: 2024-06-05
Payer: COMMERCIAL

## 2024-06-05 ENCOUNTER — NON-APPOINTMENT (OUTPATIENT)
Age: 34
End: 2024-06-05

## 2024-06-05 VITALS
BODY MASS INDEX: 22.15 KG/M2 | SYSTOLIC BLOOD PRESSURE: 120 MMHG | HEIGHT: 63 IN | OXYGEN SATURATION: 98 % | WEIGHT: 125 LBS | DIASTOLIC BLOOD PRESSURE: 70 MMHG | HEART RATE: 85 BPM

## 2024-06-05 VITALS — HEART RATE: 60 BPM | DIASTOLIC BLOOD PRESSURE: 70 MMHG | SYSTOLIC BLOOD PRESSURE: 120 MMHG

## 2024-06-05 DIAGNOSIS — R55 SYNCOPE AND COLLAPSE: ICD-10-CM

## 2024-06-05 DIAGNOSIS — R42 DIZZINESS AND GIDDINESS: ICD-10-CM

## 2024-06-05 PROCEDURE — 93244 EXT ECG>48HR<7D REV&INTERPJ: CPT

## 2024-06-05 PROCEDURE — 99204 OFFICE O/P NEW MOD 45 MIN: CPT | Mod: 25

## 2024-06-05 PROCEDURE — 93000 ELECTROCARDIOGRAM COMPLETE: CPT | Mod: 59

## 2024-06-05 PROCEDURE — ZZZZZ: CPT

## 2024-06-05 PROCEDURE — 93242 EXT ECG>48HR<7D RECORDING: CPT

## 2024-06-05 RX ORDER — AZITHROMYCIN 500 MG/1
500 TABLET, FILM COATED ORAL DAILY
Qty: 5 | Refills: 0 | Status: DISCONTINUED | COMMUNITY
Start: 2018-09-25 | End: 2024-06-05

## 2024-06-05 RX ORDER — MISOPROSTOL 200 UG/1
200 TABLET ORAL
Qty: 2 | Refills: 0 | Status: DISCONTINUED | COMMUNITY
Start: 2023-07-26 | End: 2024-06-05

## 2024-06-05 NOTE — DISCUSSION/SUMMARY
[FreeTextEntry1] : Referred to EP. 7 day cardiac monitor. Sched echo. Discussed warning symptoms and signs, and prevention.   Keep well hydrated. Call for recurrence. Complete neuro work up - MRI pending.   [EKG obtained to assist in diagnosis and management of assessed problem(s)] : EKG obtained to assist in diagnosis and management of assessed problem(s)

## 2024-06-05 NOTE — HISTORY OF PRESENT ILLNESS
[FreeTextEntry1] : KAREN DECKER is a 34 year old female presents for eval for syncope. She has longstanding intermittent dizziness for about 4.5 years.  No associated symptoms. She has had unremarkable cardio and neuro work up. Recently had episode at home, she was standing and had LOC that was less than 1 min. Not witnessed.  No prodrome. She had facial trauma from fall.  said she was oriented and pale.  No diaphoresis or nausea. No chest pain, SOB, palps. Went to ER, CT brain unremarkable other than arachnoid cyst. MRI pending. Was felt not to be consistent with seizure.   Soc - non-smoker. Fam hist - Father - CAD. No sudden cardiac death.

## 2024-06-11 ENCOUNTER — APPOINTMENT (OUTPATIENT)
Dept: PSYCHIATRY | Facility: CLINIC | Age: 34
End: 2024-06-11
Payer: COMMERCIAL

## 2024-06-11 PROCEDURE — 90834 PSYTX W PT 45 MINUTES: CPT | Mod: 95

## 2024-06-16 NOTE — PLAN
[facility] Medicine Services  INPATIENT PROGRESS NOTE    Patient Name: Tawny Shin  Date of Admission: 6/12/2024  Today's Date: 06/16/24  Length of Stay: 0  Primary Care Physician: Moises Oliveira MD    Subjective   Chief Complaint: Altered mental status  HPI   Speech has evaluated and recommended continuing with regular diet.  Urine culture had to be resent and sensitivity is currently available tomorrow.  There is concern of evaluating contaminated sample.  A second sample will be sent today.    6/15 Patient resting comfortably alert and oriented.  No cough or shortness of breath.  She wants to go home.  We are waiting for speech evaluation and the results of urine culture.  Called microbiology and urine culture has to be reset due to 2 bacteria present and this will be finalized by tomorrow.    6/14 Alert and oriented no distress.  GI consult input noted recommending SLP evaluation for swallowing exercises.  Urine cultures pending possibly discharge tomorrow.    6/13 Patient alert and oriented x 3 today.  No distress or ill-appearing.  She had complained of having difficulty with swallowing big pills over the last week and reduced oral intake due to this.  She had an esophageal dilatation 2 weeks ago which was incomplete.    Review of Systems   All pertinent negatives and positives are as above. All other systems have been reviewed and are negative unless otherwise stated.     Objective    Temp:  [97.8 °F (36.6 °C)-98.3 °F (36.8 °C)] 98.2 °F (36.8 °C)  Heart Rate:  [71-82] 82  Resp:  [18] 18  BP: (123-147)/(46-60) 125/46  Physical Exam  Vitals reviewed.   Constitutional:       General: She is not in acute distress.     Appearance: She is well-developed. She is not toxic-appearing.   HENT:      Head: Normocephalic and atraumatic.      Right Ear: External ear normal.      Left Ear: External ear normal.      Mouth/Throat:      Mouth: Mucous membranes are dry.       Pharynx: Oropharynx is clear.   Eyes:      General:         Right eye: No discharge.         Left eye: No discharge.      Extraocular Movements: Extraocular movements intact.      Conjunctiva/sclera: Conjunctivae normal.      Pupils: Pupils are equal, round, and reactive to light.   Neck:      Vascular: No JVD.   Cardiovascular:      Rate and Rhythm: Normal rate and regular rhythm.      Pulses: Normal pulses.      Heart sounds: Normal heart sounds. No murmur heard.     No friction rub. No gallop.   Pulmonary:      Effort: Pulmonary effort is normal. No respiratory distress.      Breath sounds: No stridor. No wheezing, rhonchi or rales.   Chest:      Chest wall: No tenderness.   Abdominal:      General: Bowel sounds are normal. There is no distension.      Palpations: Abdomen is soft.      Tenderness: There is no abdominal tenderness. There is no guarding or rebound.      Hernia: No hernia is present.   Musculoskeletal:         General: No swelling, tenderness or deformity. Normal range of motion.      Cervical back: Normal range of motion and neck supple. No rigidity or tenderness. No muscular tenderness.      Right lower leg: No edema.      Left lower leg: No edema.   Skin:     General: Skin is warm and dry.      Findings: No erythema or rash.   Neurological:      General: No focal deficit present.      Mental Status: She is alert and oriented to person, place, and time.      Cranial Nerves: No cranial nerve deficit.      Sensory: No sensory deficit.      Motor: No weakness or abnormal muscle tone.      Deep Tendon Reflexes: Reflexes normal.   Psychiatric:         Mood and Affect: Mood normal.         Behavior: Behavior normal.     Results Review:  I have reviewed the labs, radiology results, and diagnostic studies.    Laboratory Data:   Results from last 7 days   Lab Units 06/12/24  2112   WBC 10*3/mm3 8.62   HEMOGLOBIN g/dL 8.9*   HEMATOCRIT % 29.3*   PLATELETS 10*3/mm3 164        Results from last 7 days   Lab  "Units 06/13/24  0441 06/12/24  2112   SODIUM mmol/L 138 138   POTASSIUM mmol/L 4.0 3.7   CHLORIDE mmol/L 102 100   CO2 mmol/L 25.0 26.0   BUN mg/dL 18 19   CREATININE mg/dL 1.09* 1.13*   CALCIUM mg/dL 8.1* 8.5*   BILIRUBIN mg/dL  --  0.5   ALK PHOS U/L  --  77   ALT (SGPT) U/L  --  23   AST (SGOT) U/L  --  27   GLUCOSE mg/dL 155* 132*       Culture Data:   No results found for: \"BLOODCX\", \"URINECX\", \"WOUNDCX\", \"MRSACX\", \"RESPCX\", \"STOOLCX\"    Radiology Data:   Imaging Results (Last 24 Hours)       ** No results found for the last 24 hours. **            I have reviewed the patient's current medications.     Assessment/Plan   Assessment  Active Hospital Problems    Diagnosis     **Altered mental status     Esophageal dysphagia     UTI (urinary tract infection)     Hypothyroidism     Stage 3b chronic kidney disease     Essential hypertension     Sick sinus syndrome     Venous insufficiency     Rheumatoid arthritis involving multiple sites     Chronic heart failure with preserved ejection fraction     Chronic anticoagulation     Chronic embolism and thrombosis of unspecified deep veins of left lower extremity      Treatment Plan  Vitals every 4 hours  Up with assistance  Cardiac diet  IV fluids saline lock    UTI continue Rocephin  Follow cultures from 6/12, second sample sent 6/16  Antibiotic selection pending final sensitivity results.  Patient clinically has improved significantly on current antibiotic.    Dysphagia recent esophageal stretching, GI evaluated and recommended to continue outpatient follow-up.  SLP evaluated and recommended to continue regular diet.    Medications reviewed    DVT prophylaxis patient anticoagulated    Probably discharge tomorrow after urine culture finalized.  Probably discharge tomorrow.    Medical Decision Making  Number and Complexity of problems: 4 complex medical problems  Differential Diagnosis: See above    Conditions and Status        Condition is improving.     MDM " Data  External documents reviewed: [email or web address] EHR  Cardiac tracing (EKG, telemetry) interpretation: -  Radiology interpretation: -  Labs reviewed: -  Any tests that were considered but not ordered: -     Decision rules/scores evaluated (example [id number], Wells, etc): -     Discussed with: Patient     Care Planning  Shared decision making: Patient  Code status and discussions: DNR/DNI    Disposition  Social Determinants of Health that impact treatment or disposition: none  I expect the patient to be discharged to home in 1-2 days.     Electronically signed by Kris Cade MD, 06/16/24, 18:14 CDT.   [Cognitive and/or Behavior Therapy] : Cognitive and/or Behavior Therapy  [Motivational Interviewing] : Motivational Interviewing  [FreeTextEntry2] : Pt. has identified the following therapy goals thus far:\par Problem 1: Difficulty managing anger and unwanted expressions of anger (e.g., hitting )\par Goal 1: Identify triggers for and alternative responses to anger\par Goal 2: Learn 1-2 skills for modifying anger-related cognitions\par Problem 2: Difficulty managing anxiety \par Goal 1: Reduce HITESH-7 score by 30%\par Goal 2: Learn to anchor in the present (i.e., mindful emotion awareness). \par Goal 3: Learn 2-3 skills for modifying unhelpful cognitions\par \par \par  [de-identified] : Pt. explained that she has had difficulty managing feelings of resentment towards her . Pt. indicated that she continues to intend to stay  to her  at this time, although she felt worried about the sustainability of the relationship given her feelings of resentment. Writer validated pt.'s emotional response to this ongoing stressor. Writer and pt. engaged in Motivational Interviewing to continue to explore how remaining in the relationship aligns with pt.'s broader goals and values. Writer and pt. reviewed also behaviors that can help pt. to cope with and reduce resentment and anxiety. For example, pt. agreed to reconnect with a close friend in the next week. Pt. also agreed to continue to redirect her attention form ruminative cognitions to what she is grateful for in her relationship with her  at this time. Lastly, pt. met with her prescriber and explained to writer that the current tx plan is for the patient to remain on Klonapin (PRN; pt. takes 2-3x/week) without restarting an SSRI. Pt. explained that she felt hopeful about this plan and the possibility of continuing to reduce Klonapin use over time with CBT coping skills. [Recommended Frequency of Visits: ____] : Recommended frequency of visits: [unfilled] [Return in ____ week(s)] : Return in [unfilled] week(s)

## 2024-06-17 NOTE — PLAN
[Cognitive and/or Behavior Therapy] : Cognitive and/or Behavior Therapy  [Recommended Frequency of Visits: ____] : Recommended frequency of visits: [unfilled] [Return in ____ week(s)] : Return in [unfilled] week(s) [FreeTextEntry2] : Pt. has identified the following therapy goals thus far: Problem 1: Difficulty managing anger and unwanted expressions of anger (e.g., hitting ) Goal 1: Identify triggers for and alternative responses to anger Goal 2: Learn 1-2 skills for modifying anger-related cognitions Problem 2: Difficulty managing anxiety  Goal 1: Reduce HITESH-7 score by 30% Goal 2: Learn to anchor in the present (i.e., mindful emotion awareness).  Goal 3: Learn 2-3 skills for modifying unhelpful cognitions      [de-identified] : Pt. reported that she experienced a stressful medical event in the last week. Pt. explained that she unexpectedly fainted and as a result she was taken to the hospital by EMS for workup. Writer validated pt.'s emotional response to this experience. Pt. reported that she was discharged from the hospital and that her internist attributed the fainting episode to pt.'s low blood pressure. Writer and pt. together reviewed and challenged unhelpful beliefs that arose in response to pt.'s fainting episode. As homework, pt. identified self-care activities to engage.   RTC in 1 week b/c pt. unavailable the following week and pt. did not want to go three weeks w/o meeting. completed the homework of identifying costs and benefits of an intermediate belief that was discussed in prior session. Writer and pt. collaboratively weighed costs and benefits of this belief, and pt. agreed that the costs outweigh the benefits. Pt. identified several behaviors she could engage during the next week to counter this belief.

## 2024-06-20 ENCOUNTER — APPOINTMENT (OUTPATIENT)
Dept: PSYCHIATRY | Facility: CLINIC | Age: 34
End: 2024-06-20

## 2024-06-25 ENCOUNTER — TRANSCRIPTION ENCOUNTER (OUTPATIENT)
Age: 34
End: 2024-06-25

## 2024-06-28 ENCOUNTER — APPOINTMENT (OUTPATIENT)
Dept: PSYCHIATRY | Facility: CLINIC | Age: 34
End: 2024-06-28
Payer: COMMERCIAL

## 2024-06-28 DIAGNOSIS — F41.1 GENERALIZED ANXIETY DISORDER: ICD-10-CM

## 2024-06-28 PROCEDURE — 90834 PSYTX W PT 45 MINUTES: CPT | Mod: 95

## 2024-06-30 PROBLEM — F41.1 ANXIETY, GENERALIZED: Status: ACTIVE | Noted: 2022-10-03

## 2024-07-02 ENCOUNTER — APPOINTMENT (OUTPATIENT)
Dept: CARDIOLOGY | Facility: CLINIC | Age: 34
End: 2024-07-02
Payer: COMMERCIAL

## 2024-07-02 PROCEDURE — 93306 TTE W/DOPPLER COMPLETE: CPT

## 2024-07-05 ENCOUNTER — TRANSCRIPTION ENCOUNTER (OUTPATIENT)
Age: 34
End: 2024-07-05

## 2024-07-11 ENCOUNTER — APPOINTMENT (OUTPATIENT)
Dept: ELECTROPHYSIOLOGY | Facility: CLINIC | Age: 34
End: 2024-07-11

## 2024-07-15 ENCOUNTER — APPOINTMENT (OUTPATIENT)
Dept: PSYCHIATRY | Facility: CLINIC | Age: 34
End: 2024-07-15
Payer: COMMERCIAL

## 2024-07-15 DIAGNOSIS — F41.1 GENERALIZED ANXIETY DISORDER: ICD-10-CM

## 2024-07-15 PROCEDURE — 90834 PSYTX W PT 45 MINUTES: CPT | Mod: 95

## 2024-08-01 NOTE — END OF VISIT
[Duration of Psychotherapy Visit (minutes spent in synchronous communication): ____] : Duration of Psychotherapy Visit (minutes spent in synchronous communication): [unfilled] [Individual Psychotherapy for 38-52 minutes] : Individual Psychotherapy for 38 - 52 minutes [Licensed Clinician] : Licensed Clinician [As Noted in HPI] : as noted in HPI [Negative] : Heme/Lymph

## 2024-08-05 ENCOUNTER — APPOINTMENT (OUTPATIENT)
Dept: PSYCHIATRY | Facility: CLINIC | Age: 34
End: 2024-08-05

## 2024-08-05 PROCEDURE — 90834 PSYTX W PT 45 MINUTES: CPT | Mod: 95

## 2024-08-07 NOTE — PLAN
[Cognitive and/or Behavior Therapy] : Cognitive and/or Behavior Therapy  [Recommended Frequency of Visits: ____] : Recommended frequency of visits: [unfilled] [Return in ____ week(s)] : Return in [unfilled] week(s) [FreeTextEntry2] : Pt. has identified the following therapy goals thus far: Problem 1: Difficulty managing anger and unwanted expressions of anger (e.g., hitting ) Goal 1: Identify triggers for and alternative responses to anger Goal 2: Learn 1-2 skills for modifying anger-related cognitions Problem 2: Difficulty managing anxiety  Goal 1: Reduce HITESH-7 score by 30% Goal 2: Learn to anchor in the present (i.e., mindful emotion awareness).  Goal 3: Learn 2-3 skills for modifying unhelpful cognitions      [de-identified] : Pt. reported that she has continued to notice improvement in her ability to manage rumination and worry, which pt. attributes in part to effectiveness of her SSRI. Pt. identified one topic that she has had difficulty resisting ruminating on. Writer and pt. reviewed cognitive tools pt. could use to address rumination. Writer and pt. also discussed radical acceptance as an additional tool pt. can use for reducing anger and frustration.

## 2024-08-07 NOTE — PLAN
[Cognitive and/or Behavior Therapy] : Cognitive and/or Behavior Therapy  [Recommended Frequency of Visits: ____] : Recommended frequency of visits: [unfilled] [Return in ____ week(s)] : Return in [unfilled] week(s) [FreeTextEntry2] : Pt. has identified the following therapy goals thus far: Problem 1: Difficulty managing anger and unwanted expressions of anger (e.g., hitting ) Goal 1: Identify triggers for and alternative responses to anger Goal 2: Learn 1-2 skills for modifying anger-related cognitions Problem 2: Difficulty managing anxiety  Goal 1: Reduce HITESH-7 score by 30% Goal 2: Learn to anchor in the present (i.e., mindful emotion awareness).  Goal 3: Learn 2-3 skills for modifying unhelpful cognitions      [de-identified] : Pt. reported that she has continued to notice improvement in her ability to manage rumination and worry, which pt. attributes in part to effectiveness of her SSRI. Pt. identified one topic that she has had difficulty resisting ruminating on. Writer and pt. reviewed cognitive tools pt. could use to address rumination. Writer and pt. also discussed radical acceptance as an additional tool pt. can use for reducing anger and frustration.
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
## 2024-08-07 NOTE — REASON FOR VISIT
[Patient preference] : as per patient preference [Continuing, patient not seen in-person within last 12 months (provide details below)] : Telehealth services are continuing, patient not seen in-person within last 12 months.  [Telehealth (audio & video) - Individual/Group] : This visit was provided via telehealth using real-time 2-way audio visual technology. [Other Location: e.g. Home (Enter Location, City,State)___] : The provider was located at [unfilled]. [Home] : The patient, [unfilled], was located at home, [unfilled], at the time of the visit. [Verbal consent obtained from patient/other participant(s)] : Verbal consent for telehealth/telephonic services obtained from patient/other participant(s) [Patient] : Patient [FreeTextEntry4] : 11:00AM [FreeTextEntry5] : 11:49AM [FreeTextEntry1] : worry and stress associated with marital discord

## 2024-08-18 NOTE — H&P ADULT - NSHPOUTPATIENTPROVIDERS_GEN_ALL_CORE
[facility] Medicine Services  PROGRESS NOTE    Patient Name: Cindy Sage  : 1945  MRN: [id number]    Date of Admission: 2024  Primary Care Physician: Jose Carrillo MD    Subjective   Subjective     CC:  Abdominal pain     HPI:  Evaluated patient this AM. Nausea earlier, now resolved. Had soft BM x1. No abdominal pain today.       Objective   Objective     Vital Signs:   Temp:  [97.9 °F (36.6 °C)-98.8 °F (37.1 °C)] 98.3 °F (36.8 °C)  Heart Rate:  [76-84] 84  Resp:  [18-20] 20  BP: (130-162)/(61-72) 141/67     Physical Exam:  Constitutional: Awake, alert, resting comfortably  HENT: NCAT, mucous membranes moist  Respiratory: Clear to auscultation bilaterally, respiratory effort normal   Cardiovascular: RRR, no murmurs, rubs, or gallops  Gastrointestinal: soft, nontender, nondistended  Musculoskeletal: No bilateral ankle edema  Psychiatric: Appropriate affect, cooperative  Neurologic: Alert and oriented x 3, no focal deficits, speech clear  Skin: No rashes      Results Reviewed:  LAB RESULTS:      Lab 24  0646 24  1419 24  1030   WBC 6.91  --  7.81   HEMOGLOBIN 10.6*  --  11.4*   HEMATOCRIT 32.5*  --  35.1   PLATELETS 195  --  190   NEUTROS ABS 6.08  --  6.96   IMMATURE GRANS (ABS) 0.02  --  0.02   LYMPHS ABS 0.37*  --  0.33*   MONOS ABS 0.43  --  0.45   EOS ABS 0.00  --  0.03   MCV 90.8  --  91.9   LACTATE  --  1.6 2.5*         Lab 24  0646 24  0005 24  1030   SODIUM 134*  --  132*   POTASSIUM 4.6 4.3 3.5   CHLORIDE 100  --  96*   CO2 23.0  --  23.0   ANION GAP 11.0  --  13.0   BUN 6*  --  6*   CREATININE 0.94  --  0.92   EGFR 61.5  --  63.1   GLUCOSE 130*  --  112*   CALCIUM 8.5*  --  9.4         Lab 24  0646 24  1030   TOTAL PROTEIN 5.5* 6.6   ALBUMIN 3.7 4.0   GLOBULIN 1.8 2.6   ALT (SGPT) 6 9   AST (SGOT) 13 17   BILIRUBIN 0.6 0.7   ALK PHOS 70 87   LIPASE  --  37         Lab 24  1030   HSTROP T 12                  Brief Urine Lab Results  (Last result in the past 365 days)        Color   Clarity   Blood   Leuk Est   Nitrite   Protein   CREAT   Urine HCG        08/17/24 1104 Yellow   Clear   Negative   Small (1+)   Negative   Trace                   Microbiology Results Abnormal       None            CT Abdomen Pelvis With Contrast    Result Date: 8/17/2024  CT ABDOMEN PELVIS W CONTRAST Date of Exam: 8/17/2024 11:38 AM EDT Indication: lower abd pain. Comparison: 7/31/2024 Technique: Axial CT images were obtained of the abdomen and pelvis following the uneventful intravenous administration of 80 mL Isovue-300. Reconstructed coronal and sagittal images were also obtained. Automated exposure control and iterative construction methods were used. Findings: Patient history today indicates lower abdominal pain for at least the past several days, some nausea and constipation. Prior history of colon cancer. Previous 7/31/2024 exam showed changes of multiple previous surgeries, and cholelithiasis, but no acute disease. Today's study shows fluid-filled proximal and mid small bowel in the upper range of normal diameter but not considered pathologically enlarged, the largest loops approximately 2.5 cm. There is a well-defined transition point in the right lower pelvis, image numbers 120 through 122, series 2, with some fecalization of small bowel contents just proximal to this level, and markedly decompressed, airless distal ileum beyond it. Focal bowel wall thickening and enhancement at this level gives the impression  of a potential stricture, but no mass or inflammation is seen. Elsewhere, the included lower lungs appear clear of active disease. There is a small hiatal hernia. There is diffuse fatty liver change. Small gallstone is again seen in the otherwise normal-appearing gallbladder. Degree of pancreatic atrophy present is not unusual for patient's age. Spleen is not enlarged. Adrenal glands appear normal. There is expected  renal cortical thinning for age. No upper abdominal free air, ascites or adenopathy is seen. Uterus and ovaries are not identified. There is a very small amount of free fluid in the cul-de-sac region, not considered normal in a post menopausal female, and perhaps related to the apparent low-level small bowel obstruction. Bladder is decompressed and normal. No mass or adenopathy is seen. Delayed venous phase images show no evidence of obstructive uropathy. Bladder distends normally with contrast. Bony structures appear to be intact..     Impression: Impression: 1. Findings suspicious for mild or early developing distal small bowel obstruction, with well-defined transition point in the right lower pelvis. 2. Small gallstone. No visible gallbladder inflammation or biliary ductal dilatation. 3. No other evidence of acute intra-abdominal or intrapelvic disease elsewhere. Electronically Signed: Adrián Ramirez MD  8/17/2024 12:09 PM EDT  Workstation ID: [id number]     Results for orders placed during the hospital encounter of 02/28/19    Adult Transthoracic Echo Complete W/ Cont if Necessary Per Protocol (With Agitated Saline)    Interpretation Summary  · Estimated EF = 60%.  · Left ventricular systolic function is normal.  · Trace to mild mitral regurgitation  · Trace tricuspid regurgitation  · No evidence of PFO or ASD.      Current medications:  Scheduled Meds:anastrozole, 1 mg, Oral, Daily  aspirin, 81 mg, Oral, Daily  atorvastatin, 10 mg, Oral, Daily  cefTRIAXone, 1,000 mg, Intravenous, Q24H  clopidogrel, 75 mg, Oral, Daily  enoxaparin, 40 mg, Subcutaneous, Daily  [Held by provider] hydroCHLOROthiazide, 25 mg, Oral, Daily  [Held by provider] losartan, 25 mg, Oral, Daily  senna-docusate sodium, 2 tablet, Oral, BID  sodium chloride, 10 mL, Intravenous, Q12H      Continuous Infusions:sodium chloride, 100 mL/hr, Last Rate: 100 mL/hr (08/17/24 5148)      PRN Meds:.  acetaminophen **OR** acetaminophen **OR** acetaminophen     senna-docusate sodium **AND** polyethylene glycol **AND** bisacodyl **AND** bisacodyl    Calcium Replacement - Follow Nurse / BPA Driven Protocol    Magnesium Standard Dose Replacement - Follow Nurse / BPA Driven Protocol    ondansetron ODT **OR** ondansetron    Phosphorus Replacement - Follow Nurse / BPA Driven Protocol    Potassium Replacement - Follow Nurse / BPA Driven Protocol    [COMPLETED] Insert Peripheral IV **AND** sodium chloride    sodium chloride    sodium chloride    Assessment & Plan   Assessment & Plan     Active Hospital Problems    Diagnosis  POA    **Partial small bowel obstruction [K56.600]  Yes    Slow transit constipation [K59.01]  Yes    Bilateral malignant neoplasm of breast in female, estrogen receptor positive [C50.911, Z17.0, C50.912]  Not Applicable    TIA (transient ischemic attack) [G45.9]  Yes    History of endometrial cancer [Z85.42]  Not Applicable    Hypertension [I10]  Yes    Hyperlipidemia [E78.5]  Yes      Resolved Hospital Problems   No resolved problems to display.        Brief Hospital Course to date:  Cindy Sage is a 79 y.o. female with PMHx of endometrial cancer, breast cancer (on maintenance treatment), HTN, HLD, GERD who presented with abdominal pain. Imaging in the ED suspicious for mild/early developing distal small bowel obstruction with well-defined transition point in right lower pelvis also has some evidence of UTI although UA.     Mild/developing distal small bowel obstruction  -Symptoms improving 8/18  -Continue conservative measures with fluids, bowel regimen and pain control.  -Clear liquid diet on 8/18, advance diet as tolerated.   -PT/OT to see.      Acute UTI  -Urine culture with E.Coli   -Continue IV ceftriaxone x3 days. Follow sensitivities.      Hypertension - Add back home losartan, holding HCTZ.   Hyperlipidemia - Continue atorvastatin.   History of CVA - Continue home ASA and Plavix.   Hx of endometrial and breast cancer - Continue home  anastrozole maintenance therapy.    Expected Discharge Location and Transportation: TBD  Expected Discharge   Expected Discharge Date: 8/19/2024; Expected Discharge Time:      VTE Prophylaxis:  Pharmacologic VTE prophylaxis orders are present.         AM-PAC 6 Clicks Score (PT): 22 (08/18/24 0800)    CODE STATUS:   Code Status and Medical Interventions: CPR (Attempt to Resuscitate); Full Support   Ordered at: 08/17/24 1340     Code Status (Patient has no pulse and is not breathing):    CPR (Attempt to Resuscitate)     Medical Interventions (Patient has pulse or is breathing):    Full Support       Kelsea Astorga,   08/18/24       Dr. Head  [phone]

## 2024-08-20 ENCOUNTER — APPOINTMENT (OUTPATIENT)
Dept: PSYCHIATRY | Facility: CLINIC | Age: 34
End: 2024-08-20
Payer: COMMERCIAL

## 2024-08-20 DIAGNOSIS — F41.1 GENERALIZED ANXIETY DISORDER: ICD-10-CM

## 2024-08-20 PROCEDURE — 90837 PSYTX W PT 60 MINUTES: CPT | Mod: 95
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
## 2024-08-23 NOTE — REASON FOR VISIT
[Patient preference] : as per patient preference [Continuing, patient not seen in-person within last 12 months (provide details below)] : Telehealth services are continuing, patient not seen in-person within last 12 months.  [Telehealth (audio & video) - Individual/Group] : This visit was provided via telehealth using real-time 2-way audio visual technology. [Other Location: e.g. Home (Enter Location, City,State)___] : The provider was located at [unfilled]. [Home] : The patient, [unfilled], was located at home, [unfilled], at the time of the visit. [Verbal consent obtained from patient/other participant(s)] : Verbal consent for telehealth/telephonic services obtained from patient/other participant(s) [Patient] : Patient [FreeTextEntry4] : 1:00PM [FreeTextEntry5] : 1:53PM [FreeTextEntry1] : worry and stress associated with marital discord
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
## 2024-09-03 ENCOUNTER — APPOINTMENT (OUTPATIENT)
Dept: PSYCHIATRY | Facility: CLINIC | Age: 34
End: 2024-09-03
Payer: COMMERCIAL

## 2024-09-03 DIAGNOSIS — F41.1 GENERALIZED ANXIETY DISORDER: ICD-10-CM

## 2024-09-03 PROCEDURE — 90837 PSYTX W PT 60 MINUTES: CPT | Mod: 95
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
## 2024-09-06 NOTE — REASON FOR VISIT
[Patient preference] : as per patient preference [Continuing, patient not seen in-person within last 12 months (provide details below)] : Telehealth services are continuing, patient not seen in-person within last 12 months.  [Telehealth (audio & video) - Individual/Group] : This visit was provided via telehealth using real-time 2-way audio visual technology. [Other Location: e.g. Home (Enter Location, City,State)___] : The provider was located at [unfilled]. [Home] : The patient, [unfilled], was located at home, [unfilled], at the time of the visit. [Patient's space is appropriate for telehealth and maintains privacy/confidentiality.] : Patient's space is appropriate for telehealth and maintains privacy/confidentiality. [Verbal consent obtained from patient/other participant(s)] : Verbal consent for telehealth/telephonic services obtained from patient/other participant(s) [FreeTextEntry4] : 1:00PM [FreeTextEntry5] : 1:54PM [Patient] : Patient [FreeTextEntry1] : worry and stress associated with marital discord

## 2024-09-06 NOTE — PLAN
[FreeTextEntry2] : Pt. has identified the following therapy goals thus far: Problem 1: Difficulty managing anger and unwanted expressions of anger (e.g., hitting ) Goal 1: Identify triggers for and alternative responses to anger Goal 2: Learn 1-2 skills for modifying anger-related cognitions Problem 2: Difficulty managing anxiety  Goal 1: Reduce HITESH-7 score by 30% Goal 2: Learn to anchor in the present (i.e., mindful emotion awareness).  Goal 3: Learn 2-3 skills for modifying unhelpful cognitions      [Cognitive and/or Behavior Therapy] : Cognitive and/or Behavior Therapy  [de-identified] : Pt. reported that she and her  have experienced anxiety associated with moving. Writer validated pt.'s emotional response to this stressor. Pt. also described difficulties that she was encountering with her in-laws. Pt. associated these difficulties with irritability and rumination, which she expressed in her last appointment were important challenges for her to further address in therapy. To that end, writer and pt. reviewed how pt. can engage in radical acceptance of her in-law's behaviors. Writer and pt. reviewed specific behaviors pt. could engage in to facilitate her radical acceptance, and counter urges to ruminate and "should" their behavior away. [Recommended Frequency of Visits: ____] : Recommended frequency of visits: [unfilled] [Return in ____ week(s)] : Return in [unfilled] week(s)

## 2024-09-16 NOTE — PRE-ANESTHESIA EVALUATION ADULT - WEIGHT IN KG
56.2
Activities of daily living, including home environment that might     exacerbate pain or reduce effectiveness of the pain management plan of care as well as strategies to address these issues/Opioids not applicable/not prescribed

## 2024-09-18 ENCOUNTER — APPOINTMENT (OUTPATIENT)
Dept: PSYCHIATRY | Facility: CLINIC | Age: 34
End: 2024-09-18
Payer: COMMERCIAL

## 2024-09-18 DIAGNOSIS — F41.1 GENERALIZED ANXIETY DISORDER: ICD-10-CM

## 2024-09-18 PROCEDURE — 90834 PSYTX W PT 45 MINUTES: CPT | Mod: 95

## 2024-09-23 NOTE — PLAN
[FreeTextEntry2] : Pt. has identified the following therapy goals thus far: Problem 1: Difficulty managing anger and unwanted expressions of anger (e.g., hitting ) Goal 1: Identify triggers for and alternative responses to anger Goal 2: Learn 1-2 skills for modifying anger-related cognitions Problem 2: Difficulty managing anxiety  Goal 1: Reduce HITESH-7 score by 30% Goal 2: Learn to anchor in the present (i.e., mindful emotion awareness).  Goal 3: Learn 2-3 skills for modifying unhelpful cognitions      [Cognitive and/or Behavior Therapy] : Cognitive and/or Behavior Therapy  [de-identified] : Writer and pt. reviewed pt.'s progress in practicing radical acceptance. As homework, pt. reviewed radical acceptance handout (DBT) that writer provided to pt. Writer and pt. discussed barriers to radical acceptance and how to address those barriers. Writer and pt. also discussed a recent conflict pt. had with her , and reviewed how pt. can use assertive communication (DEAR MAN) to communicate a boundary to her . [Recommended Frequency of Visits: ____] : Recommended frequency of visits: [unfilled] [Return in ____ week(s)] : Return in [unfilled] week(s)

## 2024-09-23 NOTE — REASON FOR VISIT
[Patient preference] : as per patient preference [Continuing, patient not seen in-person within last 12 months (provide details below)] : Telehealth services are continuing, patient not seen in-person within last 12 months.  [Telehealth (audio & video) - Individual/Group] : This visit was provided via telehealth using real-time 2-way audio visual technology. [Other Location: e.g. Home (Enter Location, City,State)___] : The patient, [unfilled], was located at [unfilled] at the time of the visit. [Patient's space is appropriate for telehealth and maintains privacy/confidentiality.] : Patient's space is appropriate for telehealth and maintains privacy/confidentiality. [Verbal consent obtained from patient/other participant(s)] : Verbal consent for telehealth/telephonic services obtained from patient/other participant(s) [FreeTextEntry4] : 2:00PM [FreeTextEntry5] : 2:51PM [Patient] : Patient [FreeTextEntry1] : worry and stress associated with marital discord

## 2024-10-03 ENCOUNTER — APPOINTMENT (OUTPATIENT)
Dept: PSYCHIATRY | Facility: CLINIC | Age: 34
End: 2024-10-03
Payer: COMMERCIAL

## 2024-10-03 DIAGNOSIS — F41.1 GENERALIZED ANXIETY DISORDER: ICD-10-CM

## 2024-10-03 PROCEDURE — 90837 PSYTX W PT 60 MINUTES: CPT | Mod: 95

## 2024-10-04 NOTE — REASON FOR VISIT
[Patient preference] : as per patient preference [Continuing, patient not seen in-person within last 12 months (provide details below)] : Telehealth services are continuing, patient not seen in-person within last 12 months.  [Telehealth (audio & video) - Individual/Group] : This visit was provided via telehealth using real-time 2-way audio visual technology. [Other Location: e.g. Home (Enter Location, City,State)___] : The provider was located at [unfilled]. [Home] : The patient, [unfilled], was located at home, [unfilled], at the time of the visit. [Patient's space is appropriate for telehealth and maintains privacy/confidentiality.] : Patient's space is appropriate for telehealth and maintains privacy/confidentiality. [Verbal consent obtained from patient/other participant(s)] : Verbal consent for telehealth/telephonic services obtained from patient/other participant(s) [Patient] : Patient [FreeTextEntry4] : 2:33PM [FreeTextEntry5] : 3:26PM [FreeTextEntry1] : worry and stress associated with marital discord

## 2024-10-04 NOTE — PLAN
[Cognitive and/or Behavior Therapy] : Cognitive and/or Behavior Therapy  [Recommended Frequency of Visits: ____] : Recommended frequency of visits: [unfilled] [Return in ____ week(s)] : Return in [unfilled] week(s) [FreeTextEntry2] : Pt. has identified the following therapy goals thus far: Problem 1: Difficulty managing anger and unwanted expressions of anger (e.g., hitting ) Goal 1: Identify triggers for and alternative responses to anger Goal 2: Learn 1-2 skills for modifying anger-related cognitions Problem 2: Difficulty managing anxiety  Goal 1: Reduce HITESH-7 score by 30% Goal 2: Learn to anchor in the present (i.e., mindful emotion awareness).  Goal 3: Learn 2-3 skills for modifying unhelpful cognitions      [de-identified] : Pt. reported that she used DEAR MAN to communicate a boundary to her . Writer and pt. reviewed pt.'s experience with boundary-setting and writer provided feedback. Writer validated pt.'s emotional response to recent stressors and reinforced pt.'s consistent use of coping skills including radical acceptance, decatastrophizing, and opposite action. Writer and pt. reflected on pt.'s progress in therapy, and writer provided feedback that pt. may be ready to transition towards ending therapy. Pt. expressed some hesitation, which writer normalized. Writer and pt. agreed to reevaluate again in several weeks.

## 2024-10-18 ENCOUNTER — APPOINTMENT (OUTPATIENT)
Dept: PSYCHIATRY | Facility: CLINIC | Age: 34
End: 2024-10-18
Payer: COMMERCIAL

## 2024-10-18 DIAGNOSIS — F41.1 GENERALIZED ANXIETY DISORDER: ICD-10-CM

## 2024-10-18 PROCEDURE — 90837 PSYTX W PT 60 MINUTES: CPT | Mod: 95

## 2024-11-20 ENCOUNTER — APPOINTMENT (OUTPATIENT)
Dept: PSYCHIATRY | Facility: CLINIC | Age: 34
End: 2024-11-20
Payer: COMMERCIAL

## 2024-11-20 DIAGNOSIS — F41.1 GENERALIZED ANXIETY DISORDER: ICD-10-CM

## 2024-11-20 PROCEDURE — 90837 PSYTX W PT 60 MINUTES: CPT | Mod: 95

## 2024-12-17 ENCOUNTER — APPOINTMENT (OUTPATIENT)
Dept: PSYCHIATRY | Facility: CLINIC | Age: 34
End: 2024-12-17

## 2025-01-08 ENCOUNTER — APPOINTMENT (OUTPATIENT)
Dept: PSYCHIATRY | Facility: CLINIC | Age: 35
End: 2025-01-08
Payer: COMMERCIAL

## 2025-01-08 DIAGNOSIS — F41.1 GENERALIZED ANXIETY DISORDER: ICD-10-CM

## 2025-01-08 PROCEDURE — 90832 PSYTX W PT 30 MINUTES: CPT | Mod: 95

## 2025-02-02 ENCOUNTER — NON-APPOINTMENT (OUTPATIENT)
Age: 35
End: 2025-02-02

## 2025-02-05 ENCOUNTER — EMERGENCY (EMERGENCY)
Facility: HOSPITAL | Age: 35
LOS: 1 days | Discharge: HOME | End: 2025-02-05
Attending: EMERGENCY MEDICINE | Admitting: EMERGENCY MEDICINE
Payer: COMMERCIAL

## 2025-02-05 ENCOUNTER — APPOINTMENT (OUTPATIENT)
Dept: PSYCHIATRY | Facility: CLINIC | Age: 35
End: 2025-02-05

## 2025-02-05 VITALS
HEART RATE: 100 BPM | TEMPERATURE: 99 F | OXYGEN SATURATION: 95 % | RESPIRATION RATE: 18 BRPM | SYSTOLIC BLOOD PRESSURE: 110 MMHG | DIASTOLIC BLOOD PRESSURE: 67 MMHG

## 2025-02-05 VITALS
DIASTOLIC BLOOD PRESSURE: 73 MMHG | OXYGEN SATURATION: 98 % | SYSTOLIC BLOOD PRESSURE: 121 MMHG | HEIGHT: 63 IN | RESPIRATION RATE: 18 BRPM | HEART RATE: 114 BPM | TEMPERATURE: 98 F | WEIGHT: 125 LBS

## 2025-02-05 DIAGNOSIS — Z98.890 OTHER SPECIFIED POSTPROCEDURAL STATES: Chronic | ICD-10-CM

## 2025-02-05 DIAGNOSIS — Z90.79 ACQUIRED ABSENCE OF OTHER GENITAL ORGAN(S): Chronic | ICD-10-CM

## 2025-02-05 LAB
ALBUMIN SERPL ELPH-MCNC: 3.6 G/DL — SIGNIFICANT CHANGE UP (ref 3.3–5)
ALP SERPL-CCNC: 64 U/L — SIGNIFICANT CHANGE UP (ref 40–120)
ALT FLD-CCNC: 15 U/L — SIGNIFICANT CHANGE UP (ref 10–45)
ANION GAP SERPL CALC-SCNC: 11 MMOL/L — SIGNIFICANT CHANGE UP (ref 5–17)
AST SERPL-CCNC: 19 U/L — SIGNIFICANT CHANGE UP (ref 10–40)
BASOPHILS # BLD AUTO: 0 K/UL — SIGNIFICANT CHANGE UP (ref 0–0.2)
BASOPHILS NFR BLD AUTO: 0 % — SIGNIFICANT CHANGE UP (ref 0–2)
BILIRUB SERPL-MCNC: 0.4 MG/DL — SIGNIFICANT CHANGE UP (ref 0.2–1.2)
BUN SERPL-MCNC: 7 MG/DL — SIGNIFICANT CHANGE UP (ref 7–23)
CALCIUM SERPL-MCNC: 8.5 MG/DL — SIGNIFICANT CHANGE UP (ref 8.4–10.5)
CHLORIDE SERPL-SCNC: 99 MMOL/L — SIGNIFICANT CHANGE UP (ref 96–108)
CO2 SERPL-SCNC: 25 MMOL/L — SIGNIFICANT CHANGE UP (ref 22–31)
CREAT SERPL-MCNC: 0.55 MG/DL — SIGNIFICANT CHANGE UP (ref 0.5–1.3)
EGFR: 122 ML/MIN/1.73M2 — SIGNIFICANT CHANGE UP
EGFR: 122 ML/MIN/1.73M2 — SIGNIFICANT CHANGE UP
EOSINOPHIL # BLD AUTO: 0 K/UL — SIGNIFICANT CHANGE UP (ref 0–0.5)
EOSINOPHIL NFR BLD AUTO: 0 % — SIGNIFICANT CHANGE UP (ref 0–6)
FLUAV AG NPH QL: DETECTED
FLUBV AG NPH QL: SIGNIFICANT CHANGE UP
GLUCOSE SERPL-MCNC: 113 MG/DL — HIGH (ref 70–99)
HCT VFR BLD CALC: 35.8 % — SIGNIFICANT CHANGE UP (ref 34.5–45)
HGB BLD-MCNC: 12.5 G/DL — SIGNIFICANT CHANGE UP (ref 11.5–15.5)
IMM GRANULOCYTES NFR BLD AUTO: 0.3 % — SIGNIFICANT CHANGE UP (ref 0–0.9)
LYMPHOCYTES # BLD AUTO: 0.85 K/UL — LOW (ref 1–3.3)
LYMPHOCYTES # BLD AUTO: 11.6 % — LOW (ref 13–44)
MCHC RBC-ENTMCNC: 32.6 PG — SIGNIFICANT CHANGE UP (ref 27–34)
MCHC RBC-ENTMCNC: 34.9 G/DL — SIGNIFICANT CHANGE UP (ref 32–36)
MCV RBC AUTO: 93.5 FL — SIGNIFICANT CHANGE UP (ref 80–100)
MONOCYTES # BLD AUTO: 0.62 K/UL — SIGNIFICANT CHANGE UP (ref 0–0.9)
MONOCYTES NFR BLD AUTO: 8.4 % — SIGNIFICANT CHANGE UP (ref 2–14)
NEUTROPHILS # BLD AUTO: 5.85 K/UL — SIGNIFICANT CHANGE UP (ref 1.8–7.4)
NEUTROPHILS NFR BLD AUTO: 79.7 % — HIGH (ref 43–77)
NRBC # BLD: 0 /100 WBCS — SIGNIFICANT CHANGE UP (ref 0–0)
NRBC BLD-RTO: 0 /100 WBCS — SIGNIFICANT CHANGE UP (ref 0–0)
PLATELET # BLD AUTO: 200 K/UL — SIGNIFICANT CHANGE UP (ref 150–400)
POTASSIUM SERPL-MCNC: 3.6 MMOL/L — SIGNIFICANT CHANGE UP (ref 3.5–5.3)
POTASSIUM SERPL-SCNC: 3.6 MMOL/L — SIGNIFICANT CHANGE UP (ref 3.5–5.3)
PROT SERPL-MCNC: 7.2 G/DL — SIGNIFICANT CHANGE UP (ref 6–8.3)
RBC # BLD: 3.83 M/UL — SIGNIFICANT CHANGE UP (ref 3.8–5.2)
RBC # FLD: 12.4 % — SIGNIFICANT CHANGE UP (ref 10.3–14.5)
RSV RNA NPH QL NAA+NON-PROBE: SIGNIFICANT CHANGE UP
SARS-COV-2 RNA SPEC QL NAA+PROBE: SIGNIFICANT CHANGE UP
SODIUM SERPL-SCNC: 135 MMOL/L — SIGNIFICANT CHANGE UP (ref 135–145)
WBC # BLD: 7.34 K/UL — SIGNIFICANT CHANGE UP (ref 3.8–10.5)
WBC # FLD AUTO: 7.34 K/UL — SIGNIFICANT CHANGE UP (ref 3.8–10.5)

## 2025-02-05 PROCEDURE — 80053 COMPREHEN METABOLIC PANEL: CPT

## 2025-02-05 PROCEDURE — 99284 EMERGENCY DEPT VISIT MOD MDM: CPT | Mod: 25

## 2025-02-05 PROCEDURE — 87637 SARSCOV2&INF A&B&RSV AMP PRB: CPT

## 2025-02-05 PROCEDURE — 99284 EMERGENCY DEPT VISIT MOD MDM: CPT

## 2025-02-05 PROCEDURE — 94640 AIRWAY INHALATION TREATMENT: CPT

## 2025-02-05 PROCEDURE — 36415 COLL VENOUS BLD VENIPUNCTURE: CPT

## 2025-02-05 PROCEDURE — 96374 THER/PROPH/DIAG INJ IV PUSH: CPT

## 2025-02-05 PROCEDURE — 85025 COMPLETE CBC W/AUTO DIFF WBC: CPT

## 2025-02-05 RX ORDER — OSELTAMIVIR PHOSPHATE 75 MG/1
1 CAPSULE ORAL
Qty: 10 | Refills: 0
Start: 2025-02-05 | End: 2025-02-09

## 2025-02-05 RX ORDER — ALBUTEROL SULFATE 2.5 MG/3ML
2 VIAL, NEBULIZER (ML) INHALATION
Qty: 1 | Refills: 0
Start: 2025-02-05 | End: 2025-02-09

## 2025-02-05 RX ORDER — ACETAMINOPHEN 500 MG/5ML
1000 LIQUID (ML) ORAL ONCE
Refills: 0 | Status: COMPLETED | OUTPATIENT
Start: 2025-02-05 | End: 2025-02-05

## 2025-02-05 RX ORDER — IPRATROPIUM BROMIDE AND ALBUTEROL SULFATE .5; 2.5 MG/3ML; MG/3ML
3 SOLUTION RESPIRATORY (INHALATION)
Refills: 0 | Status: DISCONTINUED | OUTPATIENT
Start: 2025-02-05 | End: 2025-02-09

## 2025-02-05 RX ORDER — OSELTAMIVIR PHOSPHATE 75 MG/1
75 CAPSULE ORAL
Refills: 0 | Status: DISCONTINUED | OUTPATIENT
Start: 2025-02-05 | End: 2025-02-09

## 2025-02-05 RX ADMIN — OSELTAMIVIR PHOSPHATE 75 MILLIGRAM(S): 75 CAPSULE ORAL at 15:27

## 2025-02-05 RX ADMIN — IPRATROPIUM BROMIDE AND ALBUTEROL SULFATE 3 MILLILITER(S): .5; 2.5 SOLUTION RESPIRATORY (INHALATION) at 14:07

## 2025-02-05 RX ADMIN — Medication 2000 MILLILITER(S): at 13:41

## 2025-02-05 RX ADMIN — Medication 400 MILLIGRAM(S): at 13:41

## 2025-02-12 ENCOUNTER — NON-APPOINTMENT (OUTPATIENT)
Age: 35
End: 2025-02-12

## 2025-02-12 ENCOUNTER — APPOINTMENT (OUTPATIENT)
Dept: PSYCHIATRY | Facility: CLINIC | Age: 35
End: 2025-02-12
Payer: COMMERCIAL

## 2025-02-12 DIAGNOSIS — F41.1 GENERALIZED ANXIETY DISORDER: ICD-10-CM

## 2025-02-12 PROCEDURE — 90832 PSYTX W PT 30 MINUTES: CPT | Mod: 95

## 2025-07-23 ENCOUNTER — APPOINTMENT (OUTPATIENT)
Dept: ANTEPARTUM | Facility: HOSPITAL | Age: 35
End: 2025-07-23
Payer: COMMERCIAL

## 2025-07-23 ENCOUNTER — EMERGENCY (EMERGENCY)
Facility: HOSPITAL | Age: 35
LOS: 1 days | End: 2025-07-23
Attending: EMERGENCY MEDICINE
Payer: COMMERCIAL

## 2025-07-23 ENCOUNTER — OUTPATIENT (OUTPATIENT)
Dept: OUTPATIENT SERVICES | Facility: HOSPITAL | Age: 35
LOS: 1 days | End: 2025-07-23
Payer: COMMERCIAL

## 2025-07-23 ENCOUNTER — ASOB RESULT (OUTPATIENT)
Age: 35
End: 2025-07-23

## 2025-07-23 VITALS
HEART RATE: 99 BPM | SYSTOLIC BLOOD PRESSURE: 127 MMHG | DIASTOLIC BLOOD PRESSURE: 59 MMHG | RESPIRATION RATE: 19 BRPM | OXYGEN SATURATION: 100 %

## 2025-07-23 VITALS
HEIGHT: 63 IN | HEART RATE: 95 BPM | RESPIRATION RATE: 20 BRPM | TEMPERATURE: 98 F | OXYGEN SATURATION: 99 % | SYSTOLIC BLOOD PRESSURE: 108 MMHG | WEIGHT: 154.98 LBS | DIASTOLIC BLOOD PRESSURE: 52 MMHG

## 2025-07-23 VITALS — OXYGEN SATURATION: 97 %

## 2025-07-23 VITALS — SYSTOLIC BLOOD PRESSURE: 118 MMHG | DIASTOLIC BLOOD PRESSURE: 58 MMHG | TEMPERATURE: 98 F | HEART RATE: 75 BPM

## 2025-07-23 DIAGNOSIS — Z98.890 OTHER SPECIFIED POSTPROCEDURAL STATES: Chronic | ICD-10-CM

## 2025-07-23 DIAGNOSIS — Z90.79 ACQUIRED ABSENCE OF OTHER GENITAL ORGAN(S): Chronic | ICD-10-CM

## 2025-07-23 DIAGNOSIS — O26.899 OTHER SPECIFIED PREGNANCY RELATED CONDITIONS, UNSPECIFIED TRIMESTER: ICD-10-CM

## 2025-07-23 LAB
APTT BLD: 24.3 SEC — LOW (ref 26.1–36.8)
BLD GP AB SCN SERPL QL: POSITIVE — SIGNIFICANT CHANGE UP
FIBRINOGEN PPP-MCNC: 531 MG/DL — HIGH (ref 200–445)
HCT VFR BLD CALC: 28.5 % — LOW (ref 34.5–45)
HGB BLD-MCNC: 9.4 G/DL — LOW (ref 11.5–15.5)
INR BLD: 0.92 RATIO — SIGNIFICANT CHANGE UP (ref 0.85–1.16)
KLEIHAUER-BETKE CALCULATION: 0 % — SIGNIFICANT CHANGE UP (ref 0–0.2)
MCHC RBC-ENTMCNC: 31.9 PG — SIGNIFICANT CHANGE UP (ref 27–34)
MCHC RBC-ENTMCNC: 33 G/DL — SIGNIFICANT CHANGE UP (ref 32–36)
MCV RBC AUTO: 96.6 FL — SIGNIFICANT CHANGE UP (ref 80–100)
NRBC # BLD AUTO: 0 K/UL — SIGNIFICANT CHANGE UP (ref 0–0)
NRBC # FLD: 0 K/UL — SIGNIFICANT CHANGE UP (ref 0–0)
NRBC BLD AUTO-RTO: 0 /100 WBCS — SIGNIFICANT CHANGE UP (ref 0–0)
PLATELET # BLD AUTO: 235 K/UL — SIGNIFICANT CHANGE UP (ref 150–400)
PMV BLD: 11.2 FL — SIGNIFICANT CHANGE UP (ref 7–13)
PROTHROM AB SERPL-ACNC: 10.6 SEC — SIGNIFICANT CHANGE UP (ref 9.9–13.4)
RBC # BLD: 2.95 M/UL — LOW (ref 3.8–5.2)
RBC # FLD: 12.7 % — SIGNIFICANT CHANGE UP (ref 10.3–14.5)
RH IG SCN BLD-IMP: NEGATIVE — SIGNIFICANT CHANGE UP
WBC # BLD: 11.41 K/UL — HIGH (ref 3.8–10.5)
WBC # FLD AUTO: 11.41 K/UL — HIGH (ref 3.8–10.5)

## 2025-07-23 PROCEDURE — 59025 FETAL NON-STRESS TEST: CPT

## 2025-07-23 PROCEDURE — 85730 THROMBOPLASTIN TIME PARTIAL: CPT

## 2025-07-23 PROCEDURE — 99284 EMERGENCY DEPT VISIT MOD MDM: CPT

## 2025-07-23 PROCEDURE — 36415 COLL VENOUS BLD VENIPUNCTURE: CPT

## 2025-07-23 PROCEDURE — G0463: CPT

## 2025-07-23 PROCEDURE — 59025 FETAL NON-STRESS TEST: CPT | Mod: 26

## 2025-07-23 PROCEDURE — 85460 HEMOGLOBIN FETAL: CPT

## 2025-07-23 PROCEDURE — 85384 FIBRINOGEN ACTIVITY: CPT

## 2025-07-23 PROCEDURE — 85027 COMPLETE CBC AUTOMATED: CPT

## 2025-07-23 PROCEDURE — 76805 OB US >/= 14 WKS SNGL FETUS: CPT | Mod: 26

## 2025-07-23 PROCEDURE — 76819 FETAL BIOPHYS PROFIL W/O NST: CPT

## 2025-07-23 PROCEDURE — 76805 OB US >/= 14 WKS SNGL FETUS: CPT

## 2025-07-23 PROCEDURE — 86870 RBC ANTIBODY IDENTIFICATION: CPT

## 2025-07-23 PROCEDURE — 76819 FETAL BIOPHYS PROFIL W/O NST: CPT | Mod: 26

## 2025-07-23 PROCEDURE — 86850 RBC ANTIBODY SCREEN: CPT

## 2025-07-23 PROCEDURE — 86077 PHYS BLOOD BANK SERV XMATCH: CPT

## 2025-07-23 PROCEDURE — 85610 PROTHROMBIN TIME: CPT

## 2025-07-23 PROCEDURE — 99222 1ST HOSP IP/OBS MODERATE 55: CPT | Mod: 25

## 2025-07-23 PROCEDURE — 86901 BLOOD TYPING SEROLOGIC RH(D): CPT

## 2025-07-23 PROCEDURE — 86900 BLOOD TYPING SEROLOGIC ABO: CPT

## 2025-07-23 RX ORDER — SENNA 187 MG
2 TABLET ORAL DAILY
Refills: 0 | Status: DISCONTINUED | OUTPATIENT
Start: 2025-07-23 | End: 2025-08-06

## 2025-07-23 RX ORDER — CALCIUM CARBONATE 750 MG/1
2 TABLET ORAL THREE TIMES A DAY
Refills: 0 | Status: DISCONTINUED | OUTPATIENT
Start: 2025-07-23 | End: 2025-08-06

## 2025-07-23 RX ADMIN — CALCIUM CARBONATE 2 TABLET(S): 750 TABLET ORAL at 14:22

## 2025-07-23 RX ADMIN — Medication 2 TABLET(S): at 14:51

## 2025-07-23 NOTE — ED ADULT NURSE NOTE - OBJECTIVE STATEMENT
Pt is a 35y F 30 wks pregnant due 10/2/25 c/o fall down stairs. Pt states she has low BP  and felt dizzy on top of the stairs and fell down 10-12 steps on her back. Pt presents w/ bruising to buttocks. Pt denies cp, sob, dizziness, ha. Pt endorses feeling baby move. Pt is A&Ox4, breathing unlabored and spontaneous, moves all extremities. Pt resting in stretcher, bed in lowest position, aware of plan of care. Comfort and safety measures maintained.

## 2025-07-23 NOTE — ED PROVIDER NOTE - ATTENDING CONTRIBUTION TO CARE
36 yo female @ 30 weeks fell on her buttocks down the stairs earlier today.  no abdominal pain.  OB huddle called, providers @ bedside.    soft tissue buttock bruising noted only.  no other injuries.  ambulatory.  stable for transfer to OB for further eval.

## 2025-07-23 NOTE — ED PROVIDER NOTE - CLINICAL SUMMARY MEDICAL DECISION MAKING FREE TEXT BOX
35-year-old female  presents the emergency department with complaint of fall down 10-12 steps yesterday without associated loss of consciousness.  She reports bruising to the right buttock otherwise has no back pain, chest pain, abdominal pain.  Denies any vaginal discharge or vaginal bleeding.  States she still feels fetal movements.  Denies any nausea or vomiting.    Physical exam significant for female in no apparent distress was alert and oriented.  She has bruising to the right buttock otherwise there is no midline spinal tenderness, moving all 4 extremities equally with normal sensation.  Patient has gravid abdomen which is soft, nontender.     huddle activated, OB at bedside.  They agree with plan for patient to be discharged to emergency department and taken to labor and delivery for further fetal monitoring.

## 2025-07-25 DIAGNOSIS — O99.343 OTHER MENTAL DISORDERS COMPLICATING PREGNANCY, THIRD TRIMESTER: ICD-10-CM

## 2025-07-25 DIAGNOSIS — O09.523 SUPERVISION OF ELDERLY MULTIGRAVIDA, THIRD TRIMESTER: ICD-10-CM

## 2025-07-25 DIAGNOSIS — Z04.3 ENCOUNTER FOR EXAMINATION AND OBSERVATION FOLLOWING OTHER ACCIDENT: ICD-10-CM

## 2025-07-25 DIAGNOSIS — O09.13 SUPERVISION OF PREGNANCY WITH HISTORY OF ECTOPIC PREGNANCY, THIRD TRIMESTER: ICD-10-CM

## 2025-07-25 DIAGNOSIS — O09.293 SUPERVISION OF PREGNANCY WITH OTHER POOR REPRODUCTIVE OR OBSTETRIC HISTORY, THIRD TRIMESTER: ICD-10-CM

## 2025-07-25 DIAGNOSIS — F32.A DEPRESSION, UNSPECIFIED: ICD-10-CM

## 2025-07-25 DIAGNOSIS — D64.9 ANEMIA, UNSPECIFIED: ICD-10-CM

## 2025-07-25 DIAGNOSIS — O36.8330 MATERNAL CARE FOR ABNORMALITIES OF THE FETAL HEART RATE OR RHYTHM, THIRD TRIMESTER, NOT APPLICABLE OR UNSPECIFIED: ICD-10-CM

## 2025-07-25 DIAGNOSIS — O99.013 ANEMIA COMPLICATING PREGNANCY, THIRD TRIMESTER: ICD-10-CM

## 2025-07-25 DIAGNOSIS — Z3A.29 29 WEEKS GESTATION OF PREGNANCY: ICD-10-CM

## 2025-07-31 DIAGNOSIS — Z3A.29 29 WEEKS GESTATION OF PREGNANCY: ICD-10-CM

## 2025-07-31 DIAGNOSIS — O09.13 SUPERVISION OF PREGNANCY WITH HISTORY OF ECTOPIC PREGNANCY, THIRD TRIMESTER: ICD-10-CM

## 2025-07-31 DIAGNOSIS — O09.293 SUPERVISION OF PREGNANCY WITH OTHER POOR REPRODUCTIVE OR OBSTETRIC HISTORY, THIRD TRIMESTER: ICD-10-CM

## 2025-07-31 DIAGNOSIS — O99.343 OTHER MENTAL DISORDERS COMPLICATING PREGNANCY, THIRD TRIMESTER: ICD-10-CM

## 2025-09-04 ENCOUNTER — OUTPATIENT (OUTPATIENT)
Dept: OUTPATIENT SERVICES | Facility: HOSPITAL | Age: 35
LOS: 1 days | End: 2025-09-04
Payer: COMMERCIAL

## 2025-09-04 ENCOUNTER — APPOINTMENT (OUTPATIENT)
Dept: ANTEPARTUM | Facility: HOSPITAL | Age: 35
End: 2025-09-04

## 2025-09-04 VITALS — SYSTOLIC BLOOD PRESSURE: 121 MMHG | HEART RATE: 94 BPM | DIASTOLIC BLOOD PRESSURE: 56 MMHG

## 2025-09-04 VITALS
HEART RATE: 87 BPM | OXYGEN SATURATION: 100 % | DIASTOLIC BLOOD PRESSURE: 65 MMHG | TEMPERATURE: 98 F | SYSTOLIC BLOOD PRESSURE: 123 MMHG | RESPIRATION RATE: 18 BRPM

## 2025-09-04 DIAGNOSIS — Z98.890 OTHER SPECIFIED POSTPROCEDURAL STATES: Chronic | ICD-10-CM

## 2025-09-04 DIAGNOSIS — O26.899 OTHER SPECIFIED PREGNANCY RELATED CONDITIONS, UNSPECIFIED TRIMESTER: ICD-10-CM

## 2025-09-04 DIAGNOSIS — Z90.79 ACQUIRED ABSENCE OF OTHER GENITAL ORGAN(S): Chronic | ICD-10-CM

## 2025-09-04 PROBLEM — F41.9 ANXIETY DISORDER, UNSPECIFIED: Chronic | Status: ACTIVE | Noted: 2025-07-23

## 2025-09-04 PROCEDURE — 99221 1ST HOSP IP/OBS SF/LOW 40: CPT | Mod: 25

## 2025-09-04 PROCEDURE — 83986 ASSAY PH BODY FLUID NOS: CPT

## 2025-09-04 PROCEDURE — 76818 FETAL BIOPHYS PROFILE W/NST: CPT

## 2025-09-04 PROCEDURE — 83986 ASSAY PH BODY FLUID NOS: CPT | Mod: QW

## 2025-09-04 PROCEDURE — G0463: CPT

## 2025-09-04 PROCEDURE — 59025 FETAL NON-STRESS TEST: CPT

## 2025-09-08 DIAGNOSIS — O09.293 SUPERVISION OF PREGNANCY WITH OTHER POOR REPRODUCTIVE OR OBSTETRIC HISTORY, THIRD TRIMESTER: ICD-10-CM

## 2025-09-08 DIAGNOSIS — O99.013 ANEMIA COMPLICATING PREGNANCY, THIRD TRIMESTER: ICD-10-CM

## 2025-09-08 DIAGNOSIS — O09.13 SUPERVISION OF PREGNANCY WITH HISTORY OF ECTOPIC PREGNANCY, THIRD TRIMESTER: ICD-10-CM

## 2025-09-08 DIAGNOSIS — O99.343 OTHER MENTAL DISORDERS COMPLICATING PREGNANCY, THIRD TRIMESTER: ICD-10-CM

## 2025-09-08 DIAGNOSIS — Z3A.36 36 WEEKS GESTATION OF PREGNANCY: ICD-10-CM

## 2025-09-08 DIAGNOSIS — D64.9 ANEMIA, UNSPECIFIED: ICD-10-CM

## 2025-09-08 DIAGNOSIS — Z03.71 ENCOUNTER FOR SUSPECTED PROBLEM WITH AMNIOTIC CAVITY AND MEMBRANE RULED OUT: ICD-10-CM

## 2025-09-08 DIAGNOSIS — F32.A DEPRESSION, UNSPECIFIED: ICD-10-CM

## 2025-09-08 DIAGNOSIS — F41.9 ANXIETY DISORDER, UNSPECIFIED: ICD-10-CM

## 2025-09-08 DIAGNOSIS — O09.523 SUPERVISION OF ELDERLY MULTIGRAVIDA, THIRD TRIMESTER: ICD-10-CM

## (undated) DEVICE — GLV 6.5 PROTEXIS (WHITE)

## (undated) DEVICE — VACUUM CURETTE BERKLEY OLYMPUS CURVED 11MM

## (undated) DEVICE — SOCK SPECIMEN 3/8"-1/2" MALE PORT

## (undated) DEVICE — POSITIONER PATIENT SAFETY STRAP 3X60"

## (undated) DEVICE — VACUUM CURETTE BERKLEY OLYMPUS CURVED 9MM

## (undated) DEVICE — GLV 7.5 PROTEXIS (WHITE)

## (undated) DEVICE — VACUUM CURETTE MEDGYN CURVED 13MM

## (undated) DEVICE — VACUUM CURETTE BERKLEY OLYMPUS CURVED 7MM

## (undated) DEVICE — WARMING BLANKET UPPER ADULT

## (undated) DEVICE — VACUUM CURETTE BUSSE HOSP CURVED 10MM

## (undated) DEVICE — VACUUM CURETTE BUSSE HOSP CURVED 12MM

## (undated) DEVICE — TUBING UTERINE ASPIRATION 3/8" X 6FT W/O ADAPTER

## (undated) DEVICE — DRAPE LIGHT HANDLE COVER (GREEN)

## (undated) DEVICE — VACUUM CURETTE BERKLEY OLYMPUS CURVED 12MM

## (undated) DEVICE — VACUUM CURETTE BERKLEY OLYMPUS F TIP 6MM

## (undated) DEVICE — VACUUM CURETTE BERKLEY OLYMPUS CURVED 8MM

## (undated) DEVICE — VACUUM CURETTE BUSSE HOSP CURVED 14MM

## (undated) DEVICE — SOL IRR POUR NS 0.9% 500ML

## (undated) DEVICE — PACK LITHOTOMY

## (undated) DEVICE — DRAPE 1/2 SHEET 40X57"

## (undated) DEVICE — VACUUM CURETTE BUSSE HOSP CURVED 9MM

## (undated) DEVICE — POSITIONER FOAM EGG CRATE ULNAR 2PCS (PINK)

## (undated) DEVICE — VACUUM CURETTE BUSSE HOSP STRAIGHT 7MM

## (undated) DEVICE — VACUUM CURETTE BERKLEY OLYMPUS CURVED 16MM X 1/2"

## (undated) DEVICE — VACUUM CURETTE BUSSE HOSP CURVED 11MM

## (undated) DEVICE — VACUUM CURETTE MEDGYN CURVED 8MM